# Patient Record
Sex: MALE | Race: WHITE | NOT HISPANIC OR LATINO
[De-identification: names, ages, dates, MRNs, and addresses within clinical notes are randomized per-mention and may not be internally consistent; named-entity substitution may affect disease eponyms.]

---

## 2018-01-23 ENCOUNTER — TRANSCRIPTION ENCOUNTER (OUTPATIENT)
Age: 48
End: 2018-01-23

## 2018-04-04 ENCOUNTER — TRANSCRIPTION ENCOUNTER (OUTPATIENT)
Age: 48
End: 2018-04-04

## 2021-06-03 ENCOUNTER — APPOINTMENT (OUTPATIENT)
Dept: ORTHOPEDIC SURGERY | Facility: CLINIC | Age: 51
End: 2021-06-03
Payer: COMMERCIAL

## 2021-06-03 VITALS
SYSTOLIC BLOOD PRESSURE: 147 MMHG | WEIGHT: 235 LBS | BODY MASS INDEX: 33.64 KG/M2 | HEIGHT: 70 IN | HEART RATE: 68 BPM | DIASTOLIC BLOOD PRESSURE: 78 MMHG

## 2021-06-03 DIAGNOSIS — Z60.2 PROBLEMS RELATED TO LIVING ALONE: ICD-10-CM

## 2021-06-03 DIAGNOSIS — Z78.9 OTHER SPECIFIED HEALTH STATUS: ICD-10-CM

## 2021-06-03 DIAGNOSIS — G24.9 DYSTONIA, UNSPECIFIED: ICD-10-CM

## 2021-06-03 DIAGNOSIS — Z80.9 FAMILY HISTORY OF MALIGNANT NEOPLASM, UNSPECIFIED: ICD-10-CM

## 2021-06-03 PROCEDURE — 72100 X-RAY EXAM L-S SPINE 2/3 VWS: CPT

## 2021-06-03 PROCEDURE — 99204 OFFICE O/P NEW MOD 45 MIN: CPT

## 2021-06-03 PROCEDURE — 99072 ADDL SUPL MATRL&STAF TM PHE: CPT

## 2021-06-03 RX ORDER — MELOXICAM 15 MG/1
15 TABLET ORAL
Qty: 30 | Refills: 1 | Status: ACTIVE | COMMUNITY
Start: 2021-06-03 | End: 1900-01-01

## 2021-06-03 SDOH — SOCIAL STABILITY - SOCIAL INSECURITY: PROBLEMS RELATED TO LIVING ALONE: Z60.2

## 2021-06-22 ENCOUNTER — TRANSCRIPTION ENCOUNTER (OUTPATIENT)
Age: 51
End: 2021-06-22

## 2021-06-28 ENCOUNTER — APPOINTMENT (OUTPATIENT)
Dept: ORTHOPEDIC SURGERY | Facility: CLINIC | Age: 51
End: 2021-06-28
Payer: COMMERCIAL

## 2021-06-28 PROCEDURE — 99442: CPT

## 2023-08-22 ENCOUNTER — APPOINTMENT (OUTPATIENT)
Dept: ORTHOPEDIC SURGERY | Facility: CLINIC | Age: 53
End: 2023-08-22
Payer: COMMERCIAL

## 2023-08-22 VITALS — HEIGHT: 70 IN | WEIGHT: 235 LBS | BODY MASS INDEX: 33.64 KG/M2

## 2023-08-22 PROCEDURE — 99214 OFFICE O/P EST MOD 30 MIN: CPT

## 2023-08-22 PROCEDURE — 72082 X-RAY EXAM ENTIRE SPI 2/3 VW: CPT

## 2024-02-21 ENCOUNTER — APPOINTMENT (OUTPATIENT)
Dept: ORTHOPEDIC SURGERY | Facility: CLINIC | Age: 54
End: 2024-02-21
Payer: COMMERCIAL

## 2024-02-21 PROCEDURE — 99442: CPT | Mod: 93

## 2024-02-21 RX ORDER — MELOXICAM 15 MG/1
15 TABLET ORAL
Qty: 30 | Refills: 3 | Status: ACTIVE | COMMUNITY
Start: 2023-08-22 | End: 1900-01-01

## 2024-03-27 ENCOUNTER — APPOINTMENT (OUTPATIENT)
Dept: MRI IMAGING | Facility: CLINIC | Age: 54
End: 2024-03-27

## 2024-03-27 ENCOUNTER — OUTPATIENT (OUTPATIENT)
Dept: OUTPATIENT SERVICES | Facility: HOSPITAL | Age: 54
LOS: 1 days | End: 2024-03-27
Payer: COMMERCIAL

## 2024-03-27 ENCOUNTER — RESULT REVIEW (OUTPATIENT)
Age: 54
End: 2024-03-27

## 2024-03-27 DIAGNOSIS — Q76.2 CONGENITAL SPONDYLOLISTHESIS: ICD-10-CM

## 2024-03-27 PROCEDURE — 72148 MRI LUMBAR SPINE W/O DYE: CPT

## 2024-03-27 PROCEDURE — 72148 MRI LUMBAR SPINE W/O DYE: CPT | Mod: 26

## 2024-04-01 ENCOUNTER — APPOINTMENT (OUTPATIENT)
Dept: ORTHOPEDIC SURGERY | Facility: CLINIC | Age: 54
End: 2024-04-01
Payer: COMMERCIAL

## 2024-04-01 DIAGNOSIS — M48.07 SPINAL STENOSIS, LUMBOSACRAL REGION: ICD-10-CM

## 2024-04-01 DIAGNOSIS — M51.26 OTHER INTERVERTEBRAL DISC DISPLACEMENT, LUMBAR REGION: ICD-10-CM

## 2024-04-01 DIAGNOSIS — Q76.2 CONGENITAL SPONDYLOLISTHESIS: ICD-10-CM

## 2024-04-01 DIAGNOSIS — M41.9 SCOLIOSIS, UNSPECIFIED: ICD-10-CM

## 2024-04-01 PROCEDURE — 99442: CPT

## 2024-04-01 NOTE — PHYSICAL EXAM
[de-identified] : MR SPINE LUMBAR  - ORDERED BY: HECTOR NATH  PROCEDURE DATE:  03/27/2024  INTERPRETATION:  CLINICAL INFORMATION: Low back pain  ADDITIONAL CLINICAL INFORMATION: Not Applicable  TECHNIQUE: Multiplanar, multisequence MRI was performed of the lumbar spine. IV Contrast: NONE  PRIOR STUDIES: No priors available for comparison.  FINDINGS:  LOCALIZER: No additional findings. BONES: Chronic bilateral L5 spondylolysis. Suggestion of mild reverse S-shaped thoracolumbar scoliosis. ALIGNMENT: Grade I anterolisthesis of L5 on S1. Endplate degenerative changes at T12-L1. SACROILIAC JOINTS/SACRUM: There is no sacral fracture. The SI joints are partially visualized but are intact. CONUS AND CAUDA EQUINA: The distal cord and conus are normal in signal. Conus terminates at L1. VISUALIZED INTRAPELVIC/INTRA-ABDOMINAL SOFT TISSUES: Likely left renal cyst. PARASPINAL SOFT TISSUES: Paraspinal muscle fatty infiltration with atrophy distally.   INDIVIDUAL LEVELS:  LOWER THORACIC SPINE: Mild disc bulge with osseous ridging asymmetric to the left with moderate left foraminal narrowing at T12-L1.  L1-L2: No spinal canal or neuroforaminal stenosis. L2-L3: Small left paracentral protrusion which narrows the left lateral recess and contacts the left L3 descending nerve. No significant spinal canal stenosis. Mild left foraminal narrowing. L3-L4: Mild disc bulge. Mild bilateral facet arthrosis with left effusion. No spinal canal stenosis. Minimal right foraminal narrowing. L4-L5: Mild disc bulge. Moderate right greater than left facet arthrosis with left effusion. No significant spinal canal stenosis. Moderate right greater than left foraminal narrowing. L5-S1: Grade I anterolisthesis with disc uncovering. Bilateral facet arthrosis. Mild spinal canal stenosis. Severe right and minimal left foraminal narrowing.   IMPRESSION:  Mild-to-moderate multilevel lumbar spondylosis, most notably at L5-S1 where there is grade I anterolisthesis in the setting of bilateral spondylolysis, mild spinal canal stenosis and severe right foraminal narrowing.

## 2024-04-01 NOTE — DISCUSSION/SUMMARY
[de-identified] : 54 yo male with spondylolisthesis, scoliosis, f/u pain management. No surgical intervention.  Diagnosis, prognosis, natural history and treatment was discussed with patient. Patient was advised if the following symptoms develop: chills, fever,  loss of bladder control, bowel incontinence or urinary retention, numbness/tingling or weakness is present in upper or lower extremities, to go to the nearest emergency room. This may be a new clinical condition not present at the time of the patient visit  that may lead to paralysis and/or death, Patient advised if the above symptoms developed to also call the office immediately to inform us and to go to the nearest emergency room.

## 2024-04-01 NOTE — REASON FOR VISIT
[_______] : carole العراقي  for [FreeTextEntry2] : ANNA WILBURN is a 53 year old male being seen for a Telehealth for an MRI review. This remote educational consult was provided via real-time 2-way audio visual technology. The participant, ANNA WILBURN as located at home, [address] [place] [address], at the time of the educational consult.  The provider, HECTOR NATH, was located at the medical office located in at the time of the visit. The participant, ANNA WILBURN, and Provider participated in the remote educational consult. Verbal consent given on April 1, 2024 by the participant. ANNA WILBURN is a 53 year-old who presents to today's telehealth visit for MRI review.

## 2024-04-08 ENCOUNTER — NON-APPOINTMENT (OUTPATIENT)
Age: 54
End: 2024-04-08

## 2024-04-08 ENCOUNTER — APPOINTMENT (OUTPATIENT)
Dept: PHYSICAL MEDICINE AND REHAB | Facility: CLINIC | Age: 54
End: 2024-04-08
Payer: COMMERCIAL

## 2024-04-08 PROCEDURE — 99204 OFFICE O/P NEW MOD 45 MIN: CPT

## 2024-04-08 NOTE — ASSESSMENT
[FreeTextEntry1] : Mr. ANNA WILBURN is a 53 year M with pain in the lower back on the RIGHT > LEFT with radiation down the leg to the FEET. He reports chronic long standing pain and is noting an acute on chronic exacerbation of this pain due to lumbar radiculopathy. There are no myelopathic signs on today's exam.  Patient reassured and educated on the diagnosis and treatment options. Risks and benefits of treatment and of delaying treatment discussed with patient. Risks discussed include but not limited to: progression of symptoms, worsening pain and functional status, etc.  This note was generated using Dragon medical dictation software. A reasonable effort had been made for proofreading its contents, but spelling mistakes or grammatical errors may still remain. If there are any questions or points of clarification needed please notify my office.  Dr. Coleman's note reviewed MRI L spine reviewed Lara placard completed today No opiates due to hx of substance abuse ?Lidocaine allergy  Patient had tried and failed conservative treatment. This includes but is not limited to: Acetaminophen, NSAIDs, muscle relaxants, neuropathic medications, physician directed home exercises and/or physical therapy for at least 8 weeks. After a thorough discussion of risks and benefits patient would like to proceed with LUMBAR INTERLAMINAR EPIDURAL STEROID INJECTION AT L5-S1 WITH FLUOROSCOPIC GUIDANCE  Risks and benefits of having injection discussed with patient. Risks discussed included, but not limited to: pain, infection, bleeding requiring emergency surgery, headaches, damage to vital organs, etc.  At this time, patient appears to be psychologically stable. Based on the history, physical exam and diagnostic findings, patient will benefit from this procedure. The goal of this procedure is to reduce pain and inflammation, improve function and range of motion and to further promote increased activity and return to previous level of functioning. The ultimate goal of performing this procedure is to improve patient's quality of life.  Asking for authorization for  LUMBAR INTERLAMINAR EPIDURAL STEROID INJECTION AT L5-S1 WITH FLUOROSCOPIC GUIDANCE to help treat patients pain.  Patient will be scheduled for this procedure.  Patient was advised if the following symptoms develop: chills, fever, loss of bladder control, bowel incontinence or urinary retention, numbness/tingling or weakness is present in upper or lower extremities, to go to the nearest emergency room. This may be a new clinical condition not present at the time of the patient visit that may lead to paralysis and/or death. Patient advised if the above symptoms developed to also call the office immediately to inform us and to go to the nearest emergency room.

## 2024-04-08 NOTE — HISTORY OF PRESENT ILLNESS
[FreeTextEntry1] : ANNA WILBURN is an 53 year old M here for initial evaluation of BACK PAIN. Mr. WILBURN was sent for consultation by Dr. Coleman for possible injection.   From Dr. Coleman's note on 4/1/24: "52 yo male with spondylolisthesis, scoliosis, f/u pain management. 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 He also has dystonia and blepharospasm and gets Botox injections for it.  Patient has potential Lidocaine allergy and notes that when he had deviated septum and had lidocaine packing her had cardiac arrest. However, he had shoulder surgery with lidocaine thereafter and did not have any issues.  Pain location: lower back Quality: sharp, shooting, stabbing Radiation: down both legs Severity: 10/10 on NRS Onset: many years ago with recent exacerbation Associated symptoms: muscle spasms, tingling Numbness: denies Weakness: present Exacerbated by: activity, standing Improved by: rest, hot showers Bowel or bladder involvement: denies  Denies bowel/bladder dysfunction, saddle anesthesia, fevers, chills, weight loss, night pain, or night sweats at this time.  The pain interferes with function, ADLs and quality of life. Patient had tried Acetaminophen, NSAIDs, prescription pain medications, muscle relaxants without any lasting relief of pain.  Patient had imaging studies to evaluate the pain.  Patient had tried physical therapy, and/or physician directed home exercises, stretching, lifestyle modification for over 6 months without any significant relief.

## 2024-04-08 NOTE — PHYSICAL EXAM
[FreeTextEntry1] : General exam   Constitutional: The patient appears well-developed, well-nourished, and in no apparent distress. Patient is well-groomed.    Skin: The skin is warm and dry, with normal turgor.  Eyes: Difficulty keeping eyes open due to blepharospasm and light sensitivity  ENMT: Ears: Hearing is grossly within normal limits.    Neck: Supple: The neck is supple.    Respiratory: Inspection: Breathing unlabored.    Neurologic: Alert and oriented x 3. Uncontrollable shaking and dystonia.  Psychiatric: Patient is cooperative and appropriate.  Mood and affect are normal.  Patient's insight is good, and memory and judgment are intact.  LUMBAR EXAM   APPEARANCE: No visible scars truncal obesity No gross deformity or malalignment No erythema, swelling or ecchymosis Decreased lumbar lordosis No muscle atrophy of the left lower extremity No muscle atrophy of the right lower extremity   TENDERNESS: Multiple trigger points over bilateral lumbar paraspinal muscles neg over midline spinous processes + over left lumbar paraspinal muscles: erector spinae and quadratus lumborum + over right lumbar paraspinal muscles: erector spinae and quadratus lumborum   ROM: Pain limited A/PROM of the lumbar spine + pain with flexion, extension of the lumbar spine + pain with left side bending + pain with right side bending + pain with left rotation + pain with right rotation + hamstring tightness on the left + hamstring tightness on the right   SPECIAL TESTS: + left straight leg raising test + right straight leg raising test FABERE left neg FABERE right neg   SENSORY TESTING: Intact to light touch Left L1-S2 Intact to light touch Right L1-S2   MOTOR TESTING: Muscle tone of the left lower extremity is normal Muscle tone of the right lower extremity is normal   Left hip flexion strength is 5/5 Right hip flexion strength is 5/5   Left quadriceps strength is 5/5 Right quadriceps strength is 5/5   Left ankle dorsiflexion strength is 5/5 Right ankle dorsiflexion strength is 5/5   Left ankle plantar flexion strength is 5/5 Right ankle plantar flexion strength is 5/5   REFLEXES: Patella (L4) left 0 Patella (L4) right 0   GAIT: +antalgic gait

## 2024-04-26 ENCOUNTER — APPOINTMENT (OUTPATIENT)
Dept: PHYSICAL MEDICINE AND REHAB | Facility: CLINIC | Age: 54
End: 2024-04-26
Payer: COMMERCIAL

## 2024-04-26 ENCOUNTER — OUTPATIENT (OUTPATIENT)
Dept: OUTPATIENT SERVICES | Facility: HOSPITAL | Age: 54
LOS: 1 days | End: 2024-04-26
Payer: COMMERCIAL

## 2024-04-26 DIAGNOSIS — M54.17 RADICULOPATHY, LUMBOSACRAL REGION: ICD-10-CM

## 2024-04-26 PROCEDURE — 62323 NJX INTERLAMINAR LMBR/SAC: CPT

## 2024-04-26 NOTE — PROCEDURE
[de-identified] : [facility] [address], [place], [address] - [phone]   PATIENT: ANNA WILBURN MEDICAL RECORD: [id number] DATE OF PROCEDURE: 04/26/2024   PHYSICIAN: Miles Guardado DO  LUMBAR INTERLAMINAR EPIDURAL STEROID INJECTION WITH FLUOROSCOPIC GUIDANCE  Levels injected: L5-S1  Injectate: 80mg/mL methylPrednisolone and 0.25% Bupivacaine 1mL and 3mL Normal Saline Preservative Free  Complications: None  Estimated Blood Loss: None   Technique: After informed consent was obtained, the patient was taken to the operating room and placed in the prone position. All pressure points were supported. The lumbar region was then exposed and prepped with chlorhexidine and draped in a sterile manner. The entirety of the procedure was done under sterile technique using sterile gloves, surgical mask and cap and all medication expiration dates were verified prior to being drawn into syringes.   Using AP fluoroscopy, the L5-S1 interspace was identified. Following this, a skin wheal was created using a 25-gauge 1-1/2 inch needle and 3 mL of preservative free 1% lidocaine. Patient had lidocaine allergy listed, however on history he reported that he had a reaction to it when undergoing deviated septum surgery. Since then he had shoulder surgery with lidocaine without any issues. Through this skin wheal, a 20-gauge tuohy epidural needle was advanced under intermittent fluoroscopy until engagement with ligamentum flavum. At this point, a loss of resistance syringe filled with sterile saline was attached to the epidural needle and was used to gain access to the epidural space under intermittent AP/lateral fluoroscopy. After negative aspiration of heme and CSF, 1 mL of Omnipaque 240 contrast was injected to delineate epidural spread under fluoroscopic view. Following this, again after negative aspiration for heme and CSF, the entirety of the above mentioned injectate was easily injected to the L5-S1 epidural space without paresthesias or complications.   The patient tolerated the procedure well and was taken to phase II recovery room in stable condition with bilateral lower extremity motor and sensation intact.

## 2024-05-22 ENCOUNTER — APPOINTMENT (OUTPATIENT)
Dept: PHYSICAL MEDICINE AND REHAB | Facility: CLINIC | Age: 54
End: 2024-05-22
Payer: COMMERCIAL

## 2024-05-22 PROCEDURE — 99213 OFFICE O/P EST LOW 20 MIN: CPT

## 2024-05-22 NOTE — ASSESSMENT
[FreeTextEntry1] : Mr. ANNA WILBURN is a 53 year M with pain in the lower back on the RIGHT > LEFT with radiation down the leg to the FEET due to lumbar radiculopathy. He is s/p LUMBAR INTERLAMINAR EPIDURAL STEROID INJECTION AT L5-S1 WITH FLUOROSCOPIC GUIDANCE with excellent but short relief.  Patient reassured and educated on the diagnosis and treatment options. Risks and benefits of treatment and of delaying treatment discussed with patient. Risks discussed include but not limited to: progression of symptoms, worsening pain and functional status, etc.  This note was generated using Dragon medical dictation software. A reasonable effort had been made for proofreading its contents, but spelling mistakes or grammatical errors may still remain. If there are any questions or points of clarification needed please notify my office.  Dr. Coleman's note reviewed MRI L spine reviewed No opiates due to hx of substance abuse ?Lidocaine allergy - not a real allergy  Patient had tried and failed conservative treatment. This includes but is not limited to: Acetaminophen, NSAIDs, muscle relaxants, neuropathic medications, physician directed home exercises and/or physical therapy for at least 8 weeks. After a thorough discussion of risks and benefits patient would like to proceed with LUMBAR INTERLAMINAR EPIDURAL STEROID INJECTION AT L5-S1 WITH FLUOROSCOPIC GUIDANCE  Risks and benefits of having injection discussed with patient. Risks discussed included, but not limited to: pain, infection, bleeding requiring emergency surgery, headaches, damage to vital organs, etc.  At this time, patient appears to be psychologically stable. Based on the history, physical exam and diagnostic findings, patient will benefit from this procedure. The goal of this procedure is to reduce pain and inflammation, improve function and range of motion and to further promote increased activity and return to previous level of functioning. The ultimate goal of performing this procedure is to improve patient's quality of life.  Asking for authorization for LUMBAR INTERLAMINAR EPIDURAL STEROID INJECTION AT L5-S1 WITH FLUOROSCOPIC GUIDANCE to help treat patients pain. Patient will be scheduled for this procedure.  Sending patient for PT AQUATHERAPY for BACK PAIN to help relieve pain and improve function. Stretching, strengthening, ROM, home education and other appropriate interventions. Precautions include fall prevention.  Patient was advised if the following symptoms develop: chills, fever, loss of bladder control, bowel incontinence or urinary retention, numbness/tingling or weakness is present in upper or lower extremities, to go to the nearest emergency room. This may be a new clinical condition not present at the time of the patient visit that may lead to paralysis and/or death. Patient advised if the above symptoms developed to also call the office immediately to inform us and to go to the nearest emergency room.

## 2024-05-22 NOTE — PHYSICAL EXAM
[FreeTextEntry1] : General exam   Constitutional: The patient appears well-developed, well-nourished, and in no apparent distress. Patient is well-groomed.    Skin: The skin is warm and dry, with normal turgor.  Eyes: PERRL.    ENMT: Ears: Hearing is grossly within normal limits.    Neck: Supple: The neck is supple.    Respiratory: Inspection: Breathing unlabored.    Neurologic: Alert and oriented x 3.   Psychiatric: Patient is cooperative and appropriate.  Mood and affect are normal.  Patient's insight is good, and memory and judgment are intact.  Lumbar Truncal obesity Skin c/d/i without any erythema, swelling, effusion  Diffuse tenderness Multiple trigger points SLR + b/l Antalgic gait

## 2024-05-22 NOTE — HISTORY OF PRESENT ILLNESS
[FreeTextEntry1] : ANNA WILBURN had LUMBAR INTERLAMINAR EPIDURAL STEROID INJECTION WITH FLUOROSCOPIC GUIDANCE L5-S1 on 4/26/24. Today patient is here for follow up. Patient reports 100% reduction in pain as tested by the NRS pain scale (Pain went down from 10/10 on NRS prior to injection). Patient also reports improvement in quality of life - he was able to put on shoes and tie his laces,  front of the kitchen sink and enjoy a walk. However, his pain is coming back. It is 0/10 on NRS at this time but when he stands up and walks, after a minute it becomes 8/10 on the lower back and down the legs.   Denies any new pain, numbness or weakness, bowel/bladder dysfunction, saddle anesthesia, fevers, chills, weight loss, night pain, or night sweats at this time.

## 2024-06-07 ENCOUNTER — APPOINTMENT (OUTPATIENT)
Dept: PHYSICAL MEDICINE AND REHAB | Facility: CLINIC | Age: 54
End: 2024-06-07
Payer: COMMERCIAL

## 2024-06-07 ENCOUNTER — OUTPATIENT (OUTPATIENT)
Dept: OUTPATIENT SERVICES | Facility: HOSPITAL | Age: 54
LOS: 1 days | End: 2024-06-07
Payer: COMMERCIAL

## 2024-06-07 DIAGNOSIS — M54.16 RADICULOPATHY, LUMBAR REGION: ICD-10-CM

## 2024-06-07 PROCEDURE — 62323 NJX INTERLAMINAR LMBR/SAC: CPT

## 2024-06-07 NOTE — PROCEDURE
[de-identified] : [facility] [address], [place], [address] - [phone]   PATIENT: ANNA WILBURN MEDICAL RECORD: [id number] DATE OF PROCEDURE: 06/07/2024   PHYSICIAN: Miles Guardado DO  LUMBAR INTERLAMINAR EPIDURAL STEROID INJECTION WITH FLUOROSCOPIC GUIDANCE  Levels injected: L5-S1  Injectate: 80mg/mL methylPrednisolone and 0.25% Bupivacaine 1mL and 3mL Normal Saline Preservative Free  Complications: None  Estimated Blood Loss: None   Technique: After informed consent was obtained, the patient was taken to the operating room and placed in the prone position. All pressure points were supported. The lumbar region was then exposed and prepped with chlorhexidine and draped in a sterile manner. The entirety of the procedure was done under sterile technique using sterile gloves, surgical mask and cap and all medication expiration dates were verified prior to being drawn into syringes.   Using AP fluoroscopy, the L5-S1 interspace was identified. Following this, a skin wheal was created using a 25-gauge 1-1/2 inch needle and 3 mL of preservative free 1% lidocaine. Through this skin wheal, a 20-gauge tuohy epidural needle was advanced under intermittent fluoroscopy until engagement with ligamentum flavum. At this point, a loss of resistance syringe filled with sterile saline was attached to the epidural needle and was used to gain access to the epidural space under intermittent AP/lateral fluoroscopy. After negative aspiration of heme and CSF, 1 mL of Omnipaque 240 contrast was injected to delineate epidural spread under fluoroscopic view. Following this, again after negative aspiration for heme and CSF, the entirety of the above mentioned injectate was easily injected to the L5-S1 epidural space without paresthesias or complications.   The patient tolerated the procedure well and was taken to phase II recovery room in stable condition with bilateral lower extremity motor and sensation intact.

## 2024-06-10 DIAGNOSIS — M54.17 RADICULOPATHY, LUMBOSACRAL REGION: ICD-10-CM

## 2024-07-03 ENCOUNTER — APPOINTMENT (OUTPATIENT)
Dept: PHYSICAL MEDICINE AND REHAB | Facility: CLINIC | Age: 54
End: 2024-07-03
Payer: COMMERCIAL

## 2024-07-03 DIAGNOSIS — M54.16 RADICULOPATHY, LUMBAR REGION: ICD-10-CM

## 2024-07-03 PROCEDURE — 99213 OFFICE O/P EST LOW 20 MIN: CPT

## 2024-08-28 ENCOUNTER — APPOINTMENT (OUTPATIENT)
Dept: PHYSICAL MEDICINE AND REHAB | Facility: CLINIC | Age: 54
End: 2024-08-28

## 2024-09-11 ENCOUNTER — APPOINTMENT (OUTPATIENT)
Dept: PHYSICAL MEDICINE AND REHAB | Facility: CLINIC | Age: 54
End: 2024-09-11
Payer: COMMERCIAL

## 2024-09-11 DIAGNOSIS — M54.16 RADICULOPATHY, LUMBAR REGION: ICD-10-CM

## 2024-09-11 PROCEDURE — 99214 OFFICE O/P EST MOD 30 MIN: CPT

## 2024-09-11 NOTE — HISTORY OF PRESENT ILLNESS
[FreeTextEntry1] : ANNA WILBURN had LUMBAR INTERLAMINAR EPIDURAL STEROID INJECTION WITH FLUOROSCOPIC GUIDANCE L5-S1 on 6/7/24. Today patient is here for follow up again. Patient reports greater than 80% reduction in pain as tested by the NRS pain scale (Pain went down from 10/10 on NRS prior to injection). Patient also reports improvement in quality of life. In addition, patient reports improvement of function and ADLs along with a temporary decrease in pain medication use. Pain started to return and is 6+/10 on NRS at this time and goes down both legs. He would like to repeat the injection. His goal is to be able to continue HEP that we previously discussed.  Denies any new pain, numbness or weakness, bowel/bladder dysfunction, saddle anesthesia, fevers, chills, weight loss, night pain, or night sweats at this time.

## 2024-09-11 NOTE — END OF VISIT
[] : Resident [FreeTextEntry3] : Seen with Dr. 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 Any part of the history and physical exam performed by the physician in training was either directly reviewed and/or replicated by myself. Any procedure performed by the physician in training was performed under my direct supervision and with the consent of the patient.

## 2024-09-11 NOTE — PHYSICAL EXAM
[FreeTextEntry1] : General exam  Constitutional: The patient appears well-developed, well-nourished, and in no apparent distress. Patient is well-groomed.  Skin: The skin is warm and dry, with normal turgor.  Eyes: PERRL.  ENMT: Ears: Hearing is grossly within normal limits.  Neck: Supple: The neck is supple.  Respiratory: Inspection: Breathing unlabored.  Neurologic: Alert and oriented x 3.  Psychiatric: Patient is cooperative and appropriate. Mood and affect are normal. Patient's insight is good, and memory and judgment are intact.  Lumbar Skin c/d/i without any erythema, swelling, effusion  Diffuse tenderness Multiple trigger points Pain with bending forward SLR + b/l

## 2024-09-11 NOTE — ASSESSMENT
[FreeTextEntry1] : Mr. ANNA WILBURN is a 54 year M with pain in the lower back on the RIGHT > LEFT with radiation down the leg to the FEET due to lumbar radiculopathy. He is s/p LUMBAR INTERLAMINAR EPIDURAL STEROID INJECTION AT L5-S1 WITH FLUOROSCOPIC GUIDANCE x2 with excellent relief. Pain is starting to return.  Patient reassured and educated on the diagnosis and treatment options. Risks and benefits of treatment and of delaying treatment discussed with patient. Risks discussed include but not limited to: progression of symptoms, worsening pain and functional status, etc.  This note was generated using Dragon medical dictation software. A reasonable effort had been made for proofreading its contents, but spelling mistakes or grammatical errors may still remain. If there are any questions or points of clarification needed please notify my office.  No opiates due to hx of substance abuse ?Lidocaine allergy - not a real allergy  Patient had tried and failed conservative treatment. This includes but is not limited to: Acetaminophen, NSAIDs, muscle relaxants, neuropathic medications, physician directed home exercises and/or physical therapy for at least 8 weeks. After a thorough discussion of risks and benefits patient would like to proceed with LUMBAR INTERLAMINAR EPIDURAL STEROID INJECTION AT L5-S1 WITH FLUOROSCOPIC GUIDANCE  Risks and benefits of having injection discussed with patient. Risks discussed included, but not limited to: pain, infection, bleeding requiring emergency surgery, headaches, damage to vital organs, etc.  At this time, patient appears to be psychologically stable. Based on the history, physical exam and diagnostic findings, patient will benefit from this procedure. The goal of this procedure is to reduce pain and inflammation, improve function and range of motion and to further promote increased activity and return to previous level of functioning. The ultimate goal of performing this procedure is to improve patient's quality of life.  Asking for authorization for LUMBAR INTERLAMINAR EPIDURAL STEROID INJECTION AT L5-S1 WITH FLUOROSCOPIC GUIDANCE to help treat patients pain.  Patient will be scheduled for this procedure.  Patient was advised if the following symptoms develop: chills, fever, loss of bladder control, bowel incontinence or urinary retention, numbness/tingling or weakness is present in upper or lower extremities, to go to the nearest emergency room. This may be a new clinical condition not present at the time of the patient visit that may lead to paralysis and/or death. Patient advised if the above symptoms developed to also call the office immediately to inform us and to go to the nearest emergency room.

## 2024-09-27 ENCOUNTER — OUTPATIENT (OUTPATIENT)
Dept: OUTPATIENT SERVICES | Facility: HOSPITAL | Age: 54
LOS: 1 days | End: 2024-09-27
Payer: COMMERCIAL

## 2024-09-27 ENCOUNTER — APPOINTMENT (OUTPATIENT)
Dept: PHYSICAL MEDICINE AND REHAB | Facility: CLINIC | Age: 54
End: 2024-09-27
Payer: COMMERCIAL

## 2024-09-27 DIAGNOSIS — M54.17 RADICULOPATHY, LUMBOSACRAL REGION: ICD-10-CM

## 2024-09-27 DIAGNOSIS — M54.16 RADICULOPATHY, LUMBAR REGION: ICD-10-CM

## 2024-09-27 PROCEDURE — 62323 NJX INTERLAMINAR LMBR/SAC: CPT

## 2024-09-27 NOTE — PROCEDURE
[de-identified] : [facility] [address], [place], [address] - [phone]   PATIENT: ANNA WILBURN MEDICAL RECORD: [id number] DATE OF PROCEDURE: 09/27/2024   PHYSICIAN: Miles Guardado DO  LUMBAR INTERLAMINAR EPIDURAL STEROID INJECTION WITH FLUOROSCOPIC GUIDANCE  Levels injected: L5-S1  Injectate: 80mg/mL methylPrednisolone and 0.25% Bupivacaine 1mL and 3mL Normal Saline Preservative Free  Complications: None  Estimated Blood Loss: None   Technique: After informed consent was obtained, the patient was taken to the operating room and placed in the prone position. All pressure points were supported. The lumbar region was then exposed and prepped with chlorhexidine and draped in a sterile manner. The entirety of the procedure was done under sterile technique using sterile gloves, surgical mask and cap and all medication expiration dates were verified prior to being drawn into syringes.   Using AP fluoroscopy, the L5-S1 interspace was identified. Following this, a skin wheal was created using a 25-gauge 1-1/2 inch needle and 3 mL of preservative free 1% lidocaine. Through this skin wheal, a 20-gauge tuohy epidural needle was advanced under intermittent fluoroscopy until engagement with ligamentum flavum. At this point, a loss of resistance syringe filled with sterile saline was attached to the epidural needle and was used to gain access to the epidural space under intermittent AP/lateral fluoroscopy. After negative aspiration of heme and CSF, 1 mL of Omnipaque 240 contrast was injected to delineate epidural spread under fluoroscopic view. Following this, again after negative aspiration for heme and CSF, the entirety of the above mentioned injectate was easily injected to the L5-S1 epidural space without paresthesias or complications.   The patient tolerated the procedure well and was taken to phase II recovery room in stable condition with bilateral lower extremity motor and sensation intact.

## 2024-10-30 ENCOUNTER — APPOINTMENT (OUTPATIENT)
Dept: PHYSICAL MEDICINE AND REHAB | Facility: CLINIC | Age: 54
End: 2024-10-30

## 2024-11-27 ENCOUNTER — APPOINTMENT (OUTPATIENT)
Dept: PHYSICAL MEDICINE AND REHAB | Facility: CLINIC | Age: 54
End: 2024-11-27
Payer: COMMERCIAL

## 2024-11-27 DIAGNOSIS — M48.07 SPINAL STENOSIS, LUMBOSACRAL REGION: ICD-10-CM

## 2024-11-27 DIAGNOSIS — M51.26 OTHER INTERVERTEBRAL DISC DISPLACEMENT, LUMBAR REGION: ICD-10-CM

## 2024-11-27 PROCEDURE — 99213 OFFICE O/P EST LOW 20 MIN: CPT

## 2025-01-09 ENCOUNTER — INPATIENT (INPATIENT)
Facility: HOSPITAL | Age: 55
LOS: 1 days | Discharge: HOME | DRG: 558 | End: 2025-01-11
Attending: HOSPITALIST | Admitting: INTERNAL MEDICINE
Payer: COMMERCIAL

## 2025-01-09 VITALS
SYSTOLIC BLOOD PRESSURE: 149 MMHG | TEMPERATURE: 98 F | HEART RATE: 107 BPM | OXYGEN SATURATION: 94 % | WEIGHT: 222.67 LBS | HEIGHT: 70 IN | RESPIRATION RATE: 20 BRPM | DIASTOLIC BLOOD PRESSURE: 93 MMHG

## 2025-01-09 DIAGNOSIS — Z29.9 ENCOUNTER FOR PROPHYLACTIC MEASURES, UNSPECIFIED: ICD-10-CM

## 2025-01-09 DIAGNOSIS — R93.89 ABNORMAL FINDINGS ON DIAGNOSTIC IMAGING OF OTHER SPECIFIED BODY STRUCTURES: ICD-10-CM

## 2025-01-09 DIAGNOSIS — L03.90 CELLULITIS, UNSPECIFIED: ICD-10-CM

## 2025-01-09 DIAGNOSIS — G24.9 DYSTONIA, UNSPECIFIED: ICD-10-CM

## 2025-01-09 DIAGNOSIS — M65.90 UNSPECIFIED SYNOVITIS AND TENOSYNOVITIS, UNSPECIFIED SITE: ICD-10-CM

## 2025-01-09 DIAGNOSIS — K21.9 GASTRO-ESOPHAGEAL REFLUX DISEASE WITHOUT ESOPHAGITIS: ICD-10-CM

## 2025-01-09 LAB
ANION GAP SERPL CALC-SCNC: 8 MMOL/L — SIGNIFICANT CHANGE UP (ref 5–17)
ANION GAP SERPL CALC-SCNC: 9 MMOL/L — SIGNIFICANT CHANGE UP (ref 5–17)
APTT BLD: 31 SEC — SIGNIFICANT CHANGE UP (ref 24.5–35.6)
BASOPHILS # BLD AUTO: 0.03 K/UL — SIGNIFICANT CHANGE UP (ref 0–0.2)
BASOPHILS NFR BLD AUTO: 0.4 % — SIGNIFICANT CHANGE UP (ref 0–2)
BUN SERPL-MCNC: 12 MG/DL — SIGNIFICANT CHANGE UP (ref 7–23)
BUN SERPL-MCNC: 9 MG/DL — SIGNIFICANT CHANGE UP (ref 7–23)
CALCIUM SERPL-MCNC: 10.1 MG/DL — SIGNIFICANT CHANGE UP (ref 8.4–10.5)
CALCIUM SERPL-MCNC: 10.4 MG/DL — SIGNIFICANT CHANGE UP (ref 8.4–10.5)
CHLORIDE SERPL-SCNC: 102 MMOL/L — SIGNIFICANT CHANGE UP (ref 96–108)
CHLORIDE SERPL-SCNC: 102 MMOL/L — SIGNIFICANT CHANGE UP (ref 96–108)
CO2 SERPL-SCNC: 29 MMOL/L — SIGNIFICANT CHANGE UP (ref 22–31)
CO2 SERPL-SCNC: 31 MMOL/L — SIGNIFICANT CHANGE UP (ref 22–31)
CREAT SERPL-MCNC: 0.92 MG/DL — SIGNIFICANT CHANGE UP (ref 0.5–1.3)
CREAT SERPL-MCNC: 0.96 MG/DL — SIGNIFICANT CHANGE UP (ref 0.5–1.3)
EGFR: 93 ML/MIN/1.73M2 — SIGNIFICANT CHANGE UP
EGFR: 98 ML/MIN/1.73M2 — SIGNIFICANT CHANGE UP
EOSINOPHIL # BLD AUTO: 0.04 K/UL — SIGNIFICANT CHANGE UP (ref 0–0.5)
EOSINOPHIL NFR BLD AUTO: 0.5 % — SIGNIFICANT CHANGE UP (ref 0–6)
GLUCOSE SERPL-MCNC: 218 MG/DL — HIGH (ref 70–99)
GLUCOSE SERPL-MCNC: 266 MG/DL — HIGH (ref 70–99)
HCT VFR BLD CALC: 48.9 % — SIGNIFICANT CHANGE UP (ref 39–50)
HCT VFR BLD CALC: 49.5 % — SIGNIFICANT CHANGE UP (ref 39–50)
HGB BLD-MCNC: 15.9 G/DL — SIGNIFICANT CHANGE UP (ref 13–17)
HGB BLD-MCNC: 15.9 G/DL — SIGNIFICANT CHANGE UP (ref 13–17)
IMM GRANULOCYTES NFR BLD AUTO: 0.3 % — SIGNIFICANT CHANGE UP (ref 0–0.9)
INR BLD: 1.11 RATIO — SIGNIFICANT CHANGE UP (ref 0.85–1.16)
LACTATE SERPL-SCNC: 1.3 MMOL/L — SIGNIFICANT CHANGE UP (ref 0.7–2)
LYMPHOCYTES # BLD AUTO: 1.49 K/UL — SIGNIFICANT CHANGE UP (ref 1–3.3)
LYMPHOCYTES # BLD AUTO: 20.3 % — SIGNIFICANT CHANGE UP (ref 13–44)
MAGNESIUM SERPL-MCNC: 1.8 MG/DL — SIGNIFICANT CHANGE UP (ref 1.6–2.6)
MCHC RBC-ENTMCNC: 25.1 PG — LOW (ref 27–34)
MCHC RBC-ENTMCNC: 25.4 PG — LOW (ref 27–34)
MCHC RBC-ENTMCNC: 32.1 G/DL — SIGNIFICANT CHANGE UP (ref 32–36)
MCHC RBC-ENTMCNC: 32.5 G/DL — SIGNIFICANT CHANGE UP (ref 32–36)
MCV RBC AUTO: 78.2 FL — LOW (ref 80–100)
MCV RBC AUTO: 78.2 FL — LOW (ref 80–100)
MONOCYTES # BLD AUTO: 0.64 K/UL — SIGNIFICANT CHANGE UP (ref 0–0.9)
MONOCYTES NFR BLD AUTO: 8.7 % — SIGNIFICANT CHANGE UP (ref 2–14)
NEUTROPHILS # BLD AUTO: 5.12 K/UL — SIGNIFICANT CHANGE UP (ref 1.8–7.4)
NEUTROPHILS NFR BLD AUTO: 69.8 % — SIGNIFICANT CHANGE UP (ref 43–77)
NRBC # BLD: 0 /100 WBCS — SIGNIFICANT CHANGE UP (ref 0–0)
NRBC # BLD: 0 /100 WBCS — SIGNIFICANT CHANGE UP (ref 0–0)
PHOSPHATE SERPL-MCNC: 3.4 MG/DL — SIGNIFICANT CHANGE UP (ref 2.5–4.5)
PLATELET # BLD AUTO: 271 K/UL — SIGNIFICANT CHANGE UP (ref 150–400)
PLATELET # BLD AUTO: 274 K/UL — SIGNIFICANT CHANGE UP (ref 150–400)
POTASSIUM SERPL-MCNC: 3.7 MMOL/L — SIGNIFICANT CHANGE UP (ref 3.5–5.3)
POTASSIUM SERPL-MCNC: 3.8 MMOL/L — SIGNIFICANT CHANGE UP (ref 3.5–5.3)
POTASSIUM SERPL-SCNC: 3.7 MMOL/L — SIGNIFICANT CHANGE UP (ref 3.5–5.3)
POTASSIUM SERPL-SCNC: 3.8 MMOL/L — SIGNIFICANT CHANGE UP (ref 3.5–5.3)
PROTHROM AB SERPL-ACNC: 12.9 SEC — SIGNIFICANT CHANGE UP (ref 9.9–13.4)
RBC # BLD: 6.25 M/UL — HIGH (ref 4.2–5.8)
RBC # BLD: 6.33 M/UL — HIGH (ref 4.2–5.8)
RBC # FLD: 13.6 % — SIGNIFICANT CHANGE UP (ref 10.3–14.5)
RBC # FLD: 13.9 % — SIGNIFICANT CHANGE UP (ref 10.3–14.5)
SODIUM SERPL-SCNC: 140 MMOL/L — SIGNIFICANT CHANGE UP (ref 135–145)
SODIUM SERPL-SCNC: 141 MMOL/L — SIGNIFICANT CHANGE UP (ref 135–145)
WBC # BLD: 7.34 K/UL — SIGNIFICANT CHANGE UP (ref 3.8–10.5)
WBC # BLD: 7.84 K/UL — SIGNIFICANT CHANGE UP (ref 3.8–10.5)
WBC # FLD AUTO: 7.34 K/UL — SIGNIFICANT CHANGE UP (ref 3.8–10.5)
WBC # FLD AUTO: 7.84 K/UL — SIGNIFICANT CHANGE UP (ref 3.8–10.5)

## 2025-01-09 PROCEDURE — 99223 1ST HOSP IP/OBS HIGH 75: CPT

## 2025-01-09 PROCEDURE — 99285 EMERGENCY DEPT VISIT HI MDM: CPT

## 2025-01-09 PROCEDURE — 73140 X-RAY EXAM OF FINGER(S): CPT | Mod: 26,LT

## 2025-01-09 PROCEDURE — 93010 ELECTROCARDIOGRAM REPORT: CPT

## 2025-01-09 PROCEDURE — 71045 X-RAY EXAM CHEST 1 VIEW: CPT | Mod: 26

## 2025-01-09 RX ORDER — ACETAMINOPHEN 80 MG/.8ML
650 SOLUTION/ DROPS ORAL EVERY 6 HOURS
Refills: 0 | Status: DISCONTINUED | OUTPATIENT
Start: 2025-01-09 | End: 2025-01-11

## 2025-01-09 RX ORDER — VANCOMYCIN HYDROCHLORIDE 5 G/100ML
1000 INJECTION, POWDER, LYOPHILIZED, FOR SOLUTION INTRAVENOUS ONCE
Refills: 0 | Status: COMPLETED | OUTPATIENT
Start: 2025-01-09 | End: 2025-01-09

## 2025-01-09 RX ORDER — DIPHENHYDRAMINE HCL 25 MG
25 TABLET ORAL EVERY 6 HOURS
Refills: 0 | Status: DISCONTINUED | OUTPATIENT
Start: 2025-01-09 | End: 2025-01-11

## 2025-01-09 RX ORDER — TRIHEXYPHENIDYL HYDROCHLORIDE 5 MG/1
2 TABLET ORAL
Refills: 0 | Status: DISCONTINUED | OUTPATIENT
Start: 2025-01-09 | End: 2025-01-11

## 2025-01-09 RX ORDER — VANCOMYCIN HYDROCHLORIDE 5 G/100ML
1500 INJECTION, POWDER, LYOPHILIZED, FOR SOLUTION INTRAVENOUS EVERY 12 HOURS
Refills: 0 | Status: DISCONTINUED | OUTPATIENT
Start: 2025-01-09 | End: 2025-01-10

## 2025-01-09 RX ORDER — AMPICILLIN SODIUM AND SULBACTAM SODIUM 100; 50 MG/ML; MG/ML
3 INJECTION, POWDER, FOR SOLUTION INTRAVENOUS ONCE
Refills: 0 | Status: COMPLETED | OUTPATIENT
Start: 2025-01-09 | End: 2025-01-09

## 2025-01-09 RX ORDER — AMPICILLIN SODIUM AND SULBACTAM SODIUM 100; 50 MG/ML; MG/ML
3 INJECTION, POWDER, FOR SOLUTION INTRAVENOUS EVERY 6 HOURS
Refills: 0 | Status: DISCONTINUED | OUTPATIENT
Start: 2025-01-09 | End: 2025-01-09

## 2025-01-09 RX ORDER — GINKGO BILOBA 40 MG
3 CAPSULE ORAL AT BEDTIME
Refills: 0 | Status: DISCONTINUED | OUTPATIENT
Start: 2025-01-09 | End: 2025-01-11

## 2025-01-09 RX ORDER — MAG HYDROX/ALUMINUM HYD/SIMETH 200-200-20
30 SUSPENSION, ORAL (FINAL DOSE FORM) ORAL EVERY 4 HOURS
Refills: 0 | Status: DISCONTINUED | OUTPATIENT
Start: 2025-01-09 | End: 2025-01-11

## 2025-01-09 RX ORDER — DIPHENHYDRAMINE HCL 25 MG
25 TABLET ORAL ONCE
Refills: 0 | Status: COMPLETED | OUTPATIENT
Start: 2025-01-09 | End: 2025-01-09

## 2025-01-09 RX ORDER — AMPICILLIN SODIUM AND SULBACTAM SODIUM 100; 50 MG/ML; MG/ML
3 INJECTION, POWDER, FOR SOLUTION INTRAVENOUS EVERY 6 HOURS
Refills: 0 | Status: DISCONTINUED | OUTPATIENT
Start: 2025-01-09 | End: 2025-01-10

## 2025-01-09 RX ORDER — PANTOPRAZOLE 40 MG/1
40 TABLET, DELAYED RELEASE ORAL
Refills: 0 | Status: DISCONTINUED | OUTPATIENT
Start: 2025-01-09 | End: 2025-01-11

## 2025-01-09 RX ORDER — DIPHENHYDRAMINE HCL 25 MG
0 TABLET ORAL
Refills: 0 | DISCHARGE

## 2025-01-09 RX ADMIN — AMPICILLIN SODIUM AND SULBACTAM SODIUM 200 GRAM(S): 100; 50 INJECTION, POWDER, FOR SOLUTION INTRAVENOUS at 12:49

## 2025-01-09 RX ADMIN — Medication 25 MILLIGRAM(S): at 08:31

## 2025-01-09 RX ADMIN — AMPICILLIN SODIUM AND SULBACTAM SODIUM 3 GRAM(S): 100; 50 INJECTION, POWDER, FOR SOLUTION INTRAVENOUS at 07:44

## 2025-01-09 RX ADMIN — VANCOMYCIN HYDROCHLORIDE 250 MILLIGRAM(S): 5 INJECTION, POWDER, LYOPHILIZED, FOR SOLUTION INTRAVENOUS at 07:26

## 2025-01-09 RX ADMIN — VANCOMYCIN HYDROCHLORIDE 1000 MILLIGRAM(S): 5 INJECTION, POWDER, LYOPHILIZED, FOR SOLUTION INTRAVENOUS at 09:21

## 2025-01-09 RX ADMIN — Medication 25 MILLIGRAM(S): at 22:00

## 2025-01-09 RX ADMIN — AMPICILLIN SODIUM AND SULBACTAM SODIUM 200 GRAM(S): 100; 50 INJECTION, POWDER, FOR SOLUTION INTRAVENOUS at 07:20

## 2025-01-09 RX ADMIN — AMPICILLIN SODIUM AND SULBACTAM SODIUM 200 GRAM(S): 100; 50 INJECTION, POWDER, FOR SOLUTION INTRAVENOUS at 22:01

## 2025-01-09 RX ADMIN — VANCOMYCIN HYDROCHLORIDE 300 MILLIGRAM(S): 5 INJECTION, POWDER, LYOPHILIZED, FOR SOLUTION INTRAVENOUS at 18:10

## 2025-01-09 RX ADMIN — TRIHEXYPHENIDYL HYDROCHLORIDE 2 MILLIGRAM(S): 5 TABLET ORAL at 17:35

## 2025-01-09 NOTE — H&P ADULT - ASSESSMENT
54M with h/o dystonia (for which he requires botox injections), presents to the ED with left 3rd finger pain and swelling, concerning for cellulitis, without improvement with po antibiotics. Admitted for IV antibiotics.

## 2025-01-09 NOTE — ED ADULT NURSE NOTE - OBJECTIVE STATEMENT
Pt presents to the ED with reports of pain and swelling to his left 3rd finger. Pt states he clipped his nails on 12/31 and began having pain the next day. Pt was seen and treated at urgent care. Pt completed Doxycycline yesterday and noticed new pain and swelling to his finger. Pt reports some pus expelled after soaking in warm water. Redness noted at the distal portion of finger.

## 2025-01-09 NOTE — H&P ADULT - PROBLEM SELECTOR PLAN 1
Patient with L 3rd digit cellulitis, completed course of doxycycline outpatient w/o significant improvement  - Xray shows Left third finger soft tissue prominence noted, no acute fracture or radiopaque foreign body  - hand surgeon Dr Shah consulted in the ED for possible intervention  - ID recs appreciated, continue w/ vancomycin (check trough) and unasyn  - Cx if possible   - Supportive care, warm soaks

## 2025-01-09 NOTE — H&P ADULT - HISTORY OF PRESENT ILLNESS
54M with h/o dystonia (for which he requires botox injections), presents to the ED with left 3rd finger pain and swelling. Patient states that on new years sd when he cut his nails, there was a small piece that he tried to pull off, which was stuck, leading to infection. He was on doxycycline for 1 week, doing warm soaks and did express some pus from the medial nail area. Patient finished the course yesterday, but states even prior to the last dose he noted his finger was swollen and he had difficulty moving it. Patient denies any fevers. No chest pain, shortness of breath, n/v, diarrhea. No other joints or fingers affected.     In the ED, T is 98.5, , /93, RR 20 satting 94% on room air. Patient received vanc 1g z1, unasyn 3gx1, benadryl 25mg X1   54M with h/o dystonia (for which he requires botox injections), presents to the ED with left 3rd finger pain and swelling. Patient states that on new years sd when he cut his nails, there was a small piece that he tried to pull off, which was stuck, leading to infection. He was on doxycycline for 1 week, doing warm soaks and did express some pus from the medial nail area. Patient finished the course yesterday, but states even prior to the last dose he noted his finger was swollen and he had difficulty moving it. Patient denies any fevers. No chest pain, shortness of breath, n/v, diarrhea. No other joints or fingers affected.     In the ED, T is 98.5, , /93, RR 20 satting 94% on room air. Patient received vanc 1g z1, unasyn 3gx1, benadryl 25mg X1. ID consulted

## 2025-01-09 NOTE — H&P ADULT - NSHPREVIEWOFSYSTEMS_GEN_ALL_CORE
REVIEW OF SYSTEMS:    CONSTITUTIONAL: No weakness, fevers or chills  EYES/ENT: No visual changes;  No vertigo or throat pain   NECK: No pain or stiffness  RESPIRATORY: No cough, wheezing, hemoptysis; No shortness of breath  CARDIOVASCULAR: No chest pain or palpitations  GASTROINTESTINAL: No abdominal pain; nausea, vomiting;  diarrhea or constipation. No hemetemesis, melena or hematochezia.  GENITOURINARY: No dysuria, frequency or hematuria  NEUROLOGICAL: dystonia of face/neck  MUSCULOSKELETAL: left 3rd finger swelling and pain and redness   SKIN: No itching, burning, rashes, or lesions   All other review of systems is negative unless indicated above.

## 2025-01-09 NOTE — ED ADULT NURSE NOTE - CHIEF COMPLAINT QUOTE
"I cut my finger nail and 2 days later it began to swell and pulsate. I went to urgent care and they prescribed me antibiotics, Tylenol and soaking"

## 2025-01-09 NOTE — CONSULT NOTE ADULT - SUBJECTIVE AND OBJECTIVE BOX
See dictated note.  Imp: Left hand and middle finger infx, likely deep.  Rec: Continue IV abx/ID consult/NPO after midnight tonight for probable I&D of left hand tomorrow.

## 2025-01-09 NOTE — CONSULT NOTE ADULT - ASSESSMENT
Pt is a 54M w/ PMHx of dystonia, c/o left 3rd finger pain and swelling, pt states on new years sd he cut his nails and there was a small piece that he tried to pull off and was stuck, the area became infected and he has been on doxycycline for 1 week, doing warm soaks and did express some pus from the medial nail area, finished the doxy yesterday, but states even prior to the last dose he noted his finger was swollen and he can't straighten it out, no fever    L 3rd finger Cellulitis  R/o underlying abscess    Recommendations:   C/w vancomycin  C/w unasyn  Hand c/s, need for intervention  --please send for cx if possible  Trend temps/WBC  Supportive care, warm soaks  Additional management per primary team    D/w Dr. Du  Infectious Diseases will follow. Please call with any questions.  Ewa Haines M.D.  Available on [employer] TEAMS -- *PREFERRED*  [facility] [phone]  For after 5 P.M. and weekends, please call [phone]

## 2025-01-09 NOTE — H&P ADULT - TIME BILLING
- Ordering, reviewing, and interpreting labs, testing, and imaging  - Independently obtaining a review of systems and performing a physical exam  - Reviewing consultant documentation/recommendations in addition to discussing plan of care with consultants  - Counselling and educating patient regarding interpretation of aforementioned items and plan of care

## 2025-01-09 NOTE — CONSULT NOTE ADULT - SUBJECTIVE AND OBJECTIVE BOX
[facility]  BRADY Dominique S. Shah, Y. Patel, G. [facility]  [phone]    ANNA WILBURN  54y, Male  [id number]    HPI--  HPI: 54y M with h/o dystonia, c/o left 3rd finger pain and swelling, pt states on new years sd he cut his nails and there was a small piece that he tried to pull off and was stuck, the area became infected and he has been on doxycycline for 1 week, doing warm soaks and did express some pus from the medial nail area, finished the doxy yesterday, but states even prior to the last dose he noted his finger was swollen and he can't straighten it out, no fever  Pt seen in the Ed reporting improvement after initial Abx dose      Active Medications--  acetaminophen     Tablet .. 650 milliGRAM(s) Oral every 6 hours PRN  aluminum hydroxide/magnesium hydroxide/simethicone Suspension 30 milliLiter(s) Oral every 4 hours PRN  melatonin 3 milliGRAM(s) Oral at bedtime PRN    Antimicrobials:     Immunologic:     ROS:  CONSTITUTIONAL: No fevers or chills. No weakness or headache. No weight changes.  EYES/ENT: No visual or hearing changes. No sore throat or throat pain .  NECK: No pain or stiffness  RESPIRATORY: No cough, wheezing, or hemoptysis. No shortness of breath  CARDIOVASCULAR: No chest pain or palpitations  GASTROINTESTINAL: No abdominal pain. No nausea or vomiting. No diarrhea or constipation.  GENITOURINARY: No dysuria, frequency or hematuria  NEUROLOGICAL: No numbness or weakness  SKIN: No itching or rashes  PSYCHIATRIC: Pleasant. Appropriate affect    Allergies: lidocaine (Unknown)    PMH -- Dystonia      PSH --   FH --   Social History --  EtOH: denies   Tobacco: denies   Drug Use: denies     Travel/Environmental/Occupational History:    Physical Exam--  Vital Signs Last 24 Hrs  T(F): 98.5 (09 Jan 2025 08:11), Max: 98.5 (09 Jan 2025 06:09)  HR: 89 (09 Jan 2025 08:11) (89 - 107)  BP: 134/84 (09 Jan 2025 08:11) (134/84 - 149/93)  RR: 14 (09 Jan 2025 08:11) (14 - 20)  SpO2: 96% (09 Jan 2025 08:11) (94% - 96%)  General: nontoxic-appearing, no acute distress  HEENT: NC/AT, EOMI  Lungs: no use of resp acc muscles  Heart: Regular rate and rhythm.   Abdomen: Soft. Nondistended. Nontender.   Extremities: L third finger swelling, improved ROM  Skin: Warm. Dry. Good turgor. No rash.     Laboratory & Imaging Data:  CBC:                       15.9   7.34  )-----------( 271      ( 09 Jan 2025 06:57 )             48.9     CMP: 01-09    140  |  102  |  12  ----------------------------<  266[H]  3.7   |  29  |  0.96    Ca    10.1      09 Jan 2025 06:57        Urinalysis Basic - ( 09 Jan 2025 06:57 )    Color: x / Appearance: x / SG: x / pH: x  Gluc: 266 mg/dL / Ketone: x  / Bili: x / Urobili: x   Blood: x / Protein: x / Nitrite: x   Leuk Esterase: x / RBC: x / WBC x   Sq Epi: x / Non Sq Epi: x / Bacteria: x        Microbiology: reviewed        Radiology: reviewed

## 2025-01-09 NOTE — ED PROVIDER NOTE - MUSCULOSKELETAL, MLM
left hand 3rd finger swollen throughout, no specific area of tenderness, pt unable to fully extend or flex finger, but appears in pain when attempting to extend, no tenderness in hand

## 2025-01-09 NOTE — ED PROVIDER NOTE - CLINICAL SUMMARY MEDICAL DECISION MAKING FREE TEXT BOX
pt appears to have developed paronychia after cutting nails about 1 wk ago, took 1 wk of doxycycline and did warm soaks, since yesterday the finger is more swollen and has decreased ROM, no paronychia/eponychia detected on exam, concern for deeper space infection, will start iv, iv antibiotics, imaging, d/w hand

## 2025-01-09 NOTE — H&P ADULT - NSHPPHYSICALEXAM_GEN_ALL_CORE
PHYSICAL EXAM:    Vital Signs Last 24 Hrs  T(C): 36.9 (09 Jan 2025 08:11), Max: 36.9 (09 Jan 2025 06:09)  T(F): 98.5 (09 Jan 2025 08:11), Max: 98.5 (09 Jan 2025 06:09)  HR: 89 (09 Jan 2025 08:11) (89 - 107)  BP: 134/84 (09 Jan 2025 08:11) (134/84 - 149/93)  BP(mean): --  RR: 14 (09 Jan 2025 08:11) (14 - 20)  SpO2: 96% (09 Jan 2025 08:11) (94% - 96%)    General: No acute distress.  HEENT: No scleral icterus or injection.    Neck: Supple.  Full ROM.  No JVD.  Heart: RRR.  Normal S1 and S2.  No murmurs, rubs, or gallops.   Lungs: CTAB. No wheezes, crackles, or rhonchi.    Abdomen: BS+, soft, NT/ND  Skin: Warm and dry.  No rashes.  Extremities: No edema, clubbing, or cyanosis.  2+ peripheral pulses b/l.  Musculoskeletal: L 3rd digit swelling, erythema, dec ROM, tender to palpation  Neuro: A&Ox3.  Moving all extremities

## 2025-01-09 NOTE — H&P ADULT - NSHPLABSRESULTS_GEN_ALL_CORE
EKG personally reviewed, NSR, QTc 437  CXR personally reviewed. CXR w/ cardiomegaly  Labs personally reviewed. No leukocytosis

## 2025-01-09 NOTE — H&P ADULT - PROBLEM SELECTOR PLAN 4
CXR shows large hiatal hernia noted. Some blunting of both CP angles. Borderline cardiomegaly. Slight   prominence to parahilar interstitial markings on this portable exam. Left shoulder prosthesis has appeared since previous exam. Follow-up suggested  - can f/u outpatient

## 2025-01-09 NOTE — ED PROVIDER NOTE - OBJECTIVE STATEMENT
54y M c/o left 3rd finger pain and swelling, pt states on new years sd he cut his nails and there was a small piece that he tried to pull off and was stuck, the area became infected and he has been on doxycycline for 1 week, doing warm soaks and did express some pus from the medial nail area, finished the doxy yesterday, but states even prior to the last dose he noted his finger was swollen and he can't straighten in out 54y M with h/o dystonia, c/o left 3rd finger pain and swelling, pt states on new years sd he cut his nails and there was a small piece that he tried to pull off and was stuck, the area became infected and he has been on doxycycline for 1 week, doing warm soaks and did express some pus from the medial nail area, finished the doxy yesterday, but states even prior to the last dose he noted his finger was swollen and he can't straighten it out, no fever    pt does not have a PCP, states it's been years since he last saw a PCP or had a routine check up

## 2025-01-10 LAB
ANION GAP SERPL CALC-SCNC: 11 MMOL/L — SIGNIFICANT CHANGE UP (ref 5–17)
BUN SERPL-MCNC: 11 MG/DL — SIGNIFICANT CHANGE UP (ref 7–23)
CALCIUM SERPL-MCNC: 9.6 MG/DL — SIGNIFICANT CHANGE UP (ref 8.4–10.5)
CHLORIDE SERPL-SCNC: 102 MMOL/L — SIGNIFICANT CHANGE UP (ref 96–108)
CO2 SERPL-SCNC: 27 MMOL/L — SIGNIFICANT CHANGE UP (ref 22–31)
CREAT SERPL-MCNC: 0.87 MG/DL — SIGNIFICANT CHANGE UP (ref 0.5–1.3)
EGFR: 101 ML/MIN/1.73M2 — SIGNIFICANT CHANGE UP
GLUCOSE SERPL-MCNC: 202 MG/DL — HIGH (ref 70–99)
HCT VFR BLD CALC: 46.2 % — SIGNIFICANT CHANGE UP (ref 39–50)
HGB BLD-MCNC: 14.5 G/DL — SIGNIFICANT CHANGE UP (ref 13–17)
MAGNESIUM SERPL-MCNC: 1.8 MG/DL — SIGNIFICANT CHANGE UP (ref 1.6–2.6)
MCHC RBC-ENTMCNC: 25 PG — LOW (ref 27–34)
MCHC RBC-ENTMCNC: 31.4 G/DL — LOW (ref 32–36)
MCV RBC AUTO: 79.7 FL — LOW (ref 80–100)
NRBC # BLD: 0 /100 WBCS — SIGNIFICANT CHANGE UP (ref 0–0)
PHOSPHATE SERPL-MCNC: 3.4 MG/DL — SIGNIFICANT CHANGE UP (ref 2.5–4.5)
PLATELET # BLD AUTO: 245 K/UL — SIGNIFICANT CHANGE UP (ref 150–400)
POTASSIUM SERPL-MCNC: 3.5 MMOL/L — SIGNIFICANT CHANGE UP (ref 3.5–5.3)
POTASSIUM SERPL-SCNC: 3.5 MMOL/L — SIGNIFICANT CHANGE UP (ref 3.5–5.3)
RBC # BLD: 5.8 M/UL — SIGNIFICANT CHANGE UP (ref 4.2–5.8)
RBC # FLD: 13.8 % — SIGNIFICANT CHANGE UP (ref 10.3–14.5)
SODIUM SERPL-SCNC: 140 MMOL/L — SIGNIFICANT CHANGE UP (ref 135–145)
VANCOMYCIN TROUGH SERPL-MCNC: 9.8 UG/ML — LOW (ref 10–20)
WBC # BLD: 7.12 K/UL — SIGNIFICANT CHANGE UP (ref 3.8–10.5)
WBC # FLD AUTO: 7.12 K/UL — SIGNIFICANT CHANGE UP (ref 3.8–10.5)

## 2025-01-10 PROCEDURE — 99232 SBSQ HOSP IP/OBS MODERATE 35: CPT

## 2025-01-10 RX ORDER — OMEPRAZOLE MAGNESIUM 20 MG/1
1 CAPSULE, DELAYED RELEASE ORAL
Refills: 0 | DISCHARGE

## 2025-01-10 RX ORDER — DIPHENHYDRAMINE HCL 25 MG
1 TABLET ORAL
Refills: 0 | DISCHARGE

## 2025-01-10 RX ORDER — VANCOMYCIN HYDROCHLORIDE 5 G/100ML
1500 INJECTION, POWDER, LYOPHILIZED, FOR SOLUTION INTRAVENOUS ONCE
Refills: 0 | Status: COMPLETED | OUTPATIENT
Start: 2025-01-10 | End: 2025-01-10

## 2025-01-10 RX ORDER — OXYCODONE HCL 15 MG
5 TABLET ORAL ONCE
Refills: 0 | Status: DISCONTINUED | OUTPATIENT
Start: 2025-01-10 | End: 2025-01-10

## 2025-01-10 RX ORDER — TRIHEXYPHENIDYL HYDROCHLORIDE 5 MG/1
1 TABLET ORAL
Refills: 0 | DISCHARGE

## 2025-01-10 RX ORDER — VANCOMYCIN HYDROCHLORIDE 5 G/100ML
INJECTION, POWDER, LYOPHILIZED, FOR SOLUTION INTRAVENOUS
Refills: 0 | Status: DISCONTINUED | OUTPATIENT
Start: 2025-01-10 | End: 2025-01-11

## 2025-01-10 RX ORDER — ONDANSETRON 4 MG/1
4 TABLET ORAL ONCE
Refills: 0 | Status: DISCONTINUED | OUTPATIENT
Start: 2025-01-10 | End: 2025-01-10

## 2025-01-10 RX ORDER — VANCOMYCIN HYDROCHLORIDE 5 G/100ML
1500 INJECTION, POWDER, LYOPHILIZED, FOR SOLUTION INTRAVENOUS EVERY 12 HOURS
Refills: 0 | Status: DISCONTINUED | OUTPATIENT
Start: 2025-01-11 | End: 2025-01-11

## 2025-01-10 RX ORDER — AMPICILLIN SODIUM AND SULBACTAM SODIUM 100; 50 MG/ML; MG/ML
3 INJECTION, POWDER, FOR SOLUTION INTRAVENOUS EVERY 6 HOURS
Refills: 0 | Status: DISCONTINUED | OUTPATIENT
Start: 2025-01-10 | End: 2025-01-11

## 2025-01-10 RX ORDER — HYDROMORPHONE HCL 4 MG
0.5 TABLET ORAL
Refills: 0 | Status: DISCONTINUED | OUTPATIENT
Start: 2025-01-10 | End: 2025-01-10

## 2025-01-10 RX ORDER — HYDROMORPHONE HCL 4 MG
1 TABLET ORAL
Refills: 0 | Status: DISCONTINUED | OUTPATIENT
Start: 2025-01-10 | End: 2025-01-10

## 2025-01-10 RX ORDER — PROMETHAZINE HCL 25 MG
6.25 SUPPOSITORY, RECTAL RECTAL ONCE
Refills: 0 | Status: COMPLETED | OUTPATIENT
Start: 2025-01-10 | End: 2025-01-10

## 2025-01-10 RX ORDER — SODIUM CHLORIDE 9 MG/ML
1000 INJECTION, SOLUTION INTRAVENOUS
Refills: 0 | Status: DISCONTINUED | OUTPATIENT
Start: 2025-01-10 | End: 2025-01-10

## 2025-01-10 RX ADMIN — VANCOMYCIN HYDROCHLORIDE 300 MILLIGRAM(S): 5 INJECTION, POWDER, LYOPHILIZED, FOR SOLUTION INTRAVENOUS at 05:44

## 2025-01-10 RX ADMIN — ACETAMINOPHEN 650 MILLIGRAM(S): 80 SOLUTION/ DROPS ORAL at 02:23

## 2025-01-10 RX ADMIN — SODIUM CHLORIDE 75 MILLILITER(S): 9 INJECTION, SOLUTION INTRAVENOUS at 11:36

## 2025-01-10 RX ADMIN — ACETAMINOPHEN 650 MILLIGRAM(S): 80 SOLUTION/ DROPS ORAL at 18:11

## 2025-01-10 RX ADMIN — AMPICILLIN SODIUM AND SULBACTAM SODIUM 200 GRAM(S): 100; 50 INJECTION, POWDER, FOR SOLUTION INTRAVENOUS at 16:26

## 2025-01-10 RX ADMIN — AMPICILLIN SODIUM AND SULBACTAM SODIUM 200 GRAM(S): 100; 50 INJECTION, POWDER, FOR SOLUTION INTRAVENOUS at 20:45

## 2025-01-10 RX ADMIN — VANCOMYCIN HYDROCHLORIDE 300 MILLIGRAM(S): 5 INJECTION, POWDER, LYOPHILIZED, FOR SOLUTION INTRAVENOUS at 18:11

## 2025-01-10 RX ADMIN — ACETAMINOPHEN 650 MILLIGRAM(S): 80 SOLUTION/ DROPS ORAL at 03:23

## 2025-01-10 RX ADMIN — ACETAMINOPHEN 650 MILLIGRAM(S): 80 SOLUTION/ DROPS ORAL at 10:32

## 2025-01-10 RX ADMIN — Medication 25 MILLIGRAM(S): at 20:45

## 2025-01-10 RX ADMIN — Medication 200 MILLIGRAM(S): at 10:26

## 2025-01-10 RX ADMIN — AMPICILLIN SODIUM AND SULBACTAM SODIUM 200 GRAM(S): 100; 50 INJECTION, POWDER, FOR SOLUTION INTRAVENOUS at 04:54

## 2025-01-10 RX ADMIN — TRIHEXYPHENIDYL HYDROCHLORIDE 2 MILLIGRAM(S): 5 TABLET ORAL at 05:45

## 2025-01-10 RX ADMIN — ACETAMINOPHEN 650 MILLIGRAM(S): 80 SOLUTION/ DROPS ORAL at 11:45

## 2025-01-10 RX ADMIN — PANTOPRAZOLE 40 MILLIGRAM(S): 40 TABLET, DELAYED RELEASE ORAL at 05:45

## 2025-01-10 RX ADMIN — TRIHEXYPHENIDYL HYDROCHLORIDE 2 MILLIGRAM(S): 5 TABLET ORAL at 18:11

## 2025-01-10 RX ADMIN — ACETAMINOPHEN 650 MILLIGRAM(S): 80 SOLUTION/ DROPS ORAL at 18:48

## 2025-01-10 NOTE — PROGRESS NOTE ADULT - PROBLEM SELECTOR PLAN 1
Patient with L 3rd digit cellulitis, completed course of doxycycline outpatient w/o significant improvement  - Xray shows Left third finger soft tissue prominence noted, no acute fracture or radiopaque foreign body  - hand surgeon Dr Shah following, s/p OR on 1/10  - wound cultures from OR sent  - ID recs appreciated, continue w/ vancomycin (check trough) and unasyn  - Supportive care

## 2025-01-10 NOTE — CASE MANAGEMENT PROGRESS NOTE - NSCMPROGRESSNOTE_GEN_ALL_CORE
CM called DR. Blue he stated he will be here tomorrow Saturday 1/11/25 to see patient and remove drain. As per DR. Blue patient does not need any homecare services. Will continue to follow patient. CM called DR. Blue phone number [phone] he stated he will be here tomorrow Saturday 1/11/25 to see patient and remove drain. As per DR. Blue patient does not need any homecare services. Will continue to follow patient.

## 2025-01-10 NOTE — CARE COORDINATION ASSESSMENT. - NSDCPLANSERVICES_GEN_ALL_CORE
DX:54M w/ PMHx of dystonia, c/o left 3rd finger pain and swelling, pt states on new years sd he cut his nails and there was a small piece that he tried to pull off and was stuck, the area became infected and he has been on doxycycline for 1 week, doing warm soaks and did express some pus from the medial nail area, finished the doxy yesterday, but states even prior to the last dose he noted his finger was swollen and he can't straighten it out, no fever.      Patient Receiving IV Vanco.   Patient has a 5 Nigerien feeding tube drain on left hand.    CM met with patient at bedside. Patient alert times 3.  Patient stated has 4 steps to get in the house and 12 steps to get to bedroom. Patient  spoke with Susanne family member phone number [phone]. Susanne stated that patient is due for Botox injection for his eye and epidural for his back CM made DR. Du aware stated it should be fine for him to get.       CM verified:     PCP: Patient stated has NO PCP offered resources.  Pharmacy: [facility] phone number [phone].   Insurance: Aetna  : NO.     CM offered homecare services patient declined will make provider aware. CM will continue to follow patient./No Anticipated Discharge Needs

## 2025-01-10 NOTE — CARE COORDINATION ASSESSMENT. - OTHER PERTINENT DISCHARGE PLANNING INFORMATION:
54M w/ PMHx of dystonia, c/o left 3rd finger pain and swelling, pt states on new years sd he cut his nails and there was a small piece that he tried to pull off and was stuck, the area became infected and he has been on doxycycline for 1 week, doing warm soaks and did express some pus from the medial nail area, finished the doxy yesterday, but states even prior to the last dose he noted his finger was swollen and he can't straighten it out, no fever. Met patient at bedside.  Explained role of CM, verbalized understanding. Pt was made aware a CM will remain available through hospitalization.  Contact information given in discharge/ transitions resource folder.

## 2025-01-10 NOTE — PRE-OP CHECKLIST - DNR CLARIFICATION FORM COMPLETED
.Patient aware that the provider is out of the office and is okay waiting until they return for the message to be addressed.   Thank you    Wife called asking for Standing order's for lab work  please be faxed to [facility] [place] in [place].  Fax # is [phone]  Next appointment with Dr Curtis is scheduled for   1/18/2019        n/a

## 2025-01-10 NOTE — CARE COORDINATION ASSESSMENT. - NSCAREPROVIDERS_GEN_ALL_CORE_FT
CARE PROVIDERS:  Accepting Physician: Matthew Coats  Administration: Leonard Du  Admitting: Matthew Coats  Attending: Leonard Du  Consultant: Michael Blue  Consultant: Alicia Bonner  Covering Team: Ja Vargas ED Attending: Philly Jama ED Nurse: Hardik Quesada  Infection Control: Wanda Cope  Nurse: Suri Noble  Nurse: Nisha Ricks  Nurse: Molly Wu  Nurse: Kathryn Fisher  Nurse: Lauren Centeno  Nurse: Baron Peck  Nurse: Willam Pierre  Nurse: Mariely Bradford  Override: Frannie Fierro  Override: Sweetie Luna  PCA/Nursing Assistant: Steven Cueto  Primary Team: Red Singh  Primary Team: Matthew Coats  Respiratory Therapy: Mir Birmingham  : Sascha Pollard  : Solange Chakraborty  Team: [facility] Hospitalists, Team  UR// Supp. Assoc.: Debra Sanchez

## 2025-01-10 NOTE — CARE COORDINATION ASSESSMENT. - PRO ARRIVE FROM
DX:54M w/ PMHx of dystonia, c/o left 3rd finger pain and swelling, pt states on new years sd he cut his nails and there was a small piece that he tried to pull off and was stuck, the area became infected and he has been on doxycycline for 1 week, doing warm soaks and did express some pus from the medial nail area, finished the doxy yesterday, but states even prior to the last dose he noted his finger was swollen and he can't straighten it out, no fever.      Patient Receiving IV Vanco.   Patient has a 5 Niuean feeding tube drain on left hand.    CM met with patient at bedside. Patient alert times 3.  Patient stated has 4 steps to get in the house and 12 steps to get to bedroom. Patient  spoke with Susanne family member phone number [phone]. Susanne stated that patient is due for Botox injection for his eye and epidural for his back CM made DR. Du aware stated it should be fine for him to get.       CM verified:     PCP: Patient stated has NO PCP offered resources.  Pharmacy: [facility] phone number [phone].   Insurance: Aetna  : NO.     CM offered homecare services patient declined will make provider aware. CM will continue to follow patient./home

## 2025-01-10 NOTE — PRE-OP CHECKLIST - NEEDLE GAUGE
Airway patent, Nasal mucosa clear. Mouth with normal mucosa. Throat has no vesicles, no oropharyngeal exudates and uvula is midline. 20

## 2025-01-11 ENCOUNTER — TRANSCRIPTION ENCOUNTER (OUTPATIENT)
Age: 55
End: 2025-01-11

## 2025-01-11 VITALS
HEART RATE: 72 BPM | DIASTOLIC BLOOD PRESSURE: 69 MMHG | RESPIRATION RATE: 18 BRPM | SYSTOLIC BLOOD PRESSURE: 113 MMHG | TEMPERATURE: 98 F | OXYGEN SATURATION: 93 %

## 2025-01-11 PROCEDURE — 36415 COLL VENOUS BLD VENIPUNCTURE: CPT

## 2025-01-11 PROCEDURE — 96366 THER/PROPH/DIAG IV INF ADDON: CPT

## 2025-01-11 PROCEDURE — 85730 THROMBOPLASTIN TIME PARTIAL: CPT

## 2025-01-11 PROCEDURE — 96365 THER/PROPH/DIAG IV INF INIT: CPT

## 2025-01-11 PROCEDURE — 96368 THER/DIAG CONCURRENT INF: CPT

## 2025-01-11 PROCEDURE — 83605 ASSAY OF LACTIC ACID: CPT

## 2025-01-11 PROCEDURE — 83735 ASSAY OF MAGNESIUM: CPT

## 2025-01-11 PROCEDURE — 80048 BASIC METABOLIC PNL TOTAL CA: CPT

## 2025-01-11 PROCEDURE — 80202 ASSAY OF VANCOMYCIN: CPT

## 2025-01-11 PROCEDURE — 85027 COMPLETE CBC AUTOMATED: CPT

## 2025-01-11 PROCEDURE — 71045 X-RAY EXAM CHEST 1 VIEW: CPT

## 2025-01-11 PROCEDURE — 99285 EMERGENCY DEPT VISIT HI MDM: CPT

## 2025-01-11 PROCEDURE — 85610 PROTHROMBIN TIME: CPT

## 2025-01-11 PROCEDURE — 87070 CULTURE OTHR SPECIMN AEROBIC: CPT

## 2025-01-11 PROCEDURE — 85025 COMPLETE CBC W/AUTO DIFF WBC: CPT

## 2025-01-11 PROCEDURE — 73140 X-RAY EXAM OF FINGER(S): CPT

## 2025-01-11 PROCEDURE — 93005 ELECTROCARDIOGRAM TRACING: CPT

## 2025-01-11 PROCEDURE — 99239 HOSP IP/OBS DSCHRG MGMT >30: CPT

## 2025-01-11 PROCEDURE — 84100 ASSAY OF PHOSPHORUS: CPT

## 2025-01-11 RX ORDER — DOXYCYCLINE MONOHYDRATE 100 MG
1 TABLET ORAL
Qty: 14 | Refills: 0
Start: 2025-01-11 | End: 2025-01-17

## 2025-01-11 RX ORDER — AMOXICILLIN/POTASSIUM CLAV 875-125 MG
875 TABLET ORAL
Qty: 14 | Refills: 0
Start: 2025-01-11 | End: 2025-01-17

## 2025-01-11 RX ADMIN — AMPICILLIN SODIUM AND SULBACTAM SODIUM 200 GRAM(S): 100; 50 INJECTION, POWDER, FOR SOLUTION INTRAVENOUS at 09:55

## 2025-01-11 RX ADMIN — AMPICILLIN SODIUM AND SULBACTAM SODIUM 200 GRAM(S): 100; 50 INJECTION, POWDER, FOR SOLUTION INTRAVENOUS at 02:04

## 2025-01-11 RX ADMIN — VANCOMYCIN HYDROCHLORIDE 300 MILLIGRAM(S): 5 INJECTION, POWDER, LYOPHILIZED, FOR SOLUTION INTRAVENOUS at 06:18

## 2025-01-11 RX ADMIN — ACETAMINOPHEN 650 MILLIGRAM(S): 80 SOLUTION/ DROPS ORAL at 09:56

## 2025-01-11 RX ADMIN — ACETAMINOPHEN 650 MILLIGRAM(S): 80 SOLUTION/ DROPS ORAL at 09:55

## 2025-01-11 RX ADMIN — TRIHEXYPHENIDYL HYDROCHLORIDE 2 MILLIGRAM(S): 5 TABLET ORAL at 06:18

## 2025-01-11 RX ADMIN — PANTOPRAZOLE 40 MILLIGRAM(S): 40 TABLET, DELAYED RELEASE ORAL at 06:18

## 2025-01-11 NOTE — CASE MANAGEMENT PROGRESS NOTE - NSCMPROGRESSNOTE_GEN_ALL_CORE
54M w/ PMHx of dystonia, c/o left 3rd finger pain and swelling.   Per MD: d/c today to home seen by Dr Blue and ID cleared for home with po AB and given wound care supplies and instruction on how to care for hand.  Per Dr Blue pt. does not need home care services just a dry dressing until ready for a band aid that he explained to the patient.  pt. will follow up in his office Monday 1/13/25. Pt. states his family lives local and he will have them assist if he has any problem with doing the hand care.  States his car is at the hospital, if allowed he may drive himself home.  Will get d/c instruction from primary nurse Viviana.     CM met with pt. this morning about 9:45 am and introduced self and role of CM for transition planning.  pt. verbalized good understanding and was able to teach back what Dr. blue told him abiooiut finger care.  Patient stated has 4 steps to get in the house and 12 steps to get to bedroom.Declined offer for CM to contact family.    Pt. in agreement with home today with MD follow up on 1/13/25.  CM available treatment team aware.

## 2025-01-11 NOTE — DISCHARGE NOTE NURSING/CASE MANAGEMENT/SOCIAL WORK - FINANCIAL ASSISTANCE
[facility] provides services at a reduced cost to those who are determined to be eligible through [facility]’s financial assistance program. Information regarding [facility]’s financial assistance program can be found by going to [email or web address] or by calling [phone].

## 2025-01-11 NOTE — PROGRESS NOTE ADULT - SUBJECTIVE AND OBJECTIVE BOX
[facility]  BRADY Dominique Y. Patel, S. Shah, G. [facility]  [phone]    Name: ANNA WILBURN  Age: 54y  Gender: Male  MRN: [id number]    Interval History:  Pt seen in pre-op  No acute overnight events.   Notes reviewed    Antibiotics:  ampicillin/sulbactam  IVPB 3 Gram(s) IV Intermittent every 6 hours  vancomycin  IVPB 1500 milliGRAM(s) IV Intermittent every 12 hours      Medications:  acetaminophen     Tablet .. 650 milliGRAM(s) Oral every 6 hours PRN  aluminum hydroxide/magnesium hydroxide/simethicone Suspension 30 milliLiter(s) Oral every 4 hours PRN  ampicillin/sulbactam  IVPB 3 Gram(s) IV Intermittent every 6 hours  diphenhydrAMINE 25 milliGRAM(s) Oral every 6 hours PRN  melatonin 3 milliGRAM(s) Oral at bedtime PRN  pantoprazole    Tablet 40 milliGRAM(s) Oral before breakfast  trihexyphenidyl 2 milliGRAM(s) Oral two times a day  vancomycin  IVPB 1500 milliGRAM(s) IV Intermittent every 12 hours      Review of Systems:  A 10-point review of systems was obtained.  Review of systems otherwise negative except as previously noted.    Allergies: No Known Allergies    For details regarding the patient's past medical history, social history, family history, and other miscellaneous elements, please refer the initial infectious diseases consultation and/or the admitting history and physical examination for this admission.    Objective:  Vitals:   T(C): 36.7 (01-10-25 @ 08:29), Max: 36.9 (01-09-25 @ 17:03)  HR: 98 (01-10-25 @ 08:29) (57 - 98)  BP: 147/98 (01-10-25 @ 08:29) (129/70 - 154/83)  RR: 23 (01-10-25 @ 08:29) (18 - 23)  SpO2: 98% (01-10-25 @ 08:29) (94% - 100%)    Physical Examination:  General: no acute distress  HEENT: NC/AT, EOMI  Cardio: RRR  Resp: no use resp acc muscles  Abd: soft, NT, ND  Ext: L finger swelling decreased and ROM improved  Skin: warm, dry, no visible rash      Laboratory Studies:  CBC:                       14.5   7.12  )-----------( 245      ( 10 [place] 2025 08:12 )             46.2     CMP: 01-09    141  |  102  |  9   ----------------------------<  218[H]  3.8   |  31  |  0.92    Ca    10.4      09 Jan 2025 12:00  Phos  3.4     01-09  Mg     1.8     01-09        Urinalysis Basic - ( 09 Jan 2025 12:00 )    Color: x / Appearance: x / SG: x / pH: x  Gluc: 218 mg/dL / Ketone: x  / Bili: x / Urobili: x   Blood: x / Protein: x / Nitrite: x   Leuk Esterase: x / RBC: x / WBC x   Sq Epi: x / Non Sq Epi: x / Bacteria: x        Microbiology: reviewed        Radiology: reviewed      
Appreciate all efforts.  Still moderately tender with only minimal improvement.  Options/alts/risks/cxs discussed.  Will proceed with I&D.
Appreciate all efforts.  less pain.  Op findings discussed.  VSS  Alert and oriented.  Left hand/middle finger and adjacent fingers with much improved swelling, erythema and tenderness.  The drain was pulled and deep area redrained with scant minimal drainage noted, irrigated and redressed, and with minimal to no bleefing.  N&V intact and much improved ROM.  Reassured.  Discussed with pt and Dr Haines (ID).  Consider discharge home soon, on empiric combination abx, eg Aug+Doxy, and close f/u as outpt next wk in my office, and Dr Haines concurs, and if otherwise clinically stable.  May shower and wash and apply clean dressing at home.  No further acute plastic surgical intervention at this time.  Thank you.
[facility]  BRADY Dominique Y. Patel, S. Shah, G. [facility]  [phone]    Name: VERONIKAFLOYDANNA ASHRAF  Age: 54y  Gender: Male  MRN: [id number]    Chart reviewed    S/p OR yesterday cx sent      
PROGRESS NOTE:   Authored by Dr. Leonard Du MD, Available on MS Teams    Patient is a 54y old  Male who presents with a chief complaint of finger infection (10 [place]anako 2025 09:03)      SUBJECTIVE / OVERNIGHT EVENTS: Patient went to the OR this am. Currently denies pain. No chest pain or shortness of breath     ADDITIONAL REVIEW OF SYSTEMS:    MEDICATIONS  (STANDING):  ampicillin/sulbactam  IVPB 3 Gram(s) IV Intermittent every 6 hours  lactated ringers. 1000 milliLiter(s) (75 mL/Hr) IV Continuous <Continuous>  pantoprazole    Tablet 40 milliGRAM(s) Oral before breakfast  trihexyphenidyl 2 milliGRAM(s) Oral two times a day  vancomycin  IVPB      vancomycin  IVPB 1500 milliGRAM(s) IV Intermittent once    MEDICATIONS  (PRN):  acetaminophen     Tablet .. 650 milliGRAM(s) Oral every 6 hours PRN Temp greater or equal to 38C (100.4F), Mild Pain (1 - 3), Moderate Pain (4 - 6)  aluminum hydroxide/magnesium hydroxide/simethicone Suspension 30 milliLiter(s) Oral every 4 hours PRN Dyspepsia  diphenhydrAMINE 25 milliGRAM(s) Oral every 6 hours PRN Rash and/or Itching  HYDROmorphone  Injectable 0.5 milliGRAM(s) IV Push every 10 minutes PRN Moderate Pain (4 - 6)  HYDROmorphone  Injectable 1 milliGRAM(s) IV Push every 10 minutes PRN Severe Pain (7 - 10)  melatonin 3 milliGRAM(s) Oral at bedtime PRN Insomnia  ondansetron Injectable 4 milliGRAM(s) IV Push once PRN Nausea and/or Vomiting  oxycodone    5 mG/acetaminophen 325 mG 1 Tablet(s) Oral every 4 hours PRN Severe Pain (7 - 10)  oxyCODONE    IR 5 milliGRAM(s) Oral once PRN Moderate Pain (4 - 6)      CAPILLARY BLOOD GLUCOSE        I&O's Summary    09 Jan 2025 07:01  -  10 [place]an[place] 2025 07:00  --------------------------------------------------------  IN: 0 mL / OUT: 2 mL / NET: -2 mL    10 [place]ako 2025 07:01  -  10 Guanako 2025 12:56  --------------------------------------------------------  IN: 815 mL / OUT: 0 mL / NET: 815 mL        PHYSICAL EXAM:  Vital Signs Last 24 Hrs  T(C): 36.6 (10 [place] 2025 10:04), Max: 36.9 (09 Jan 2025 17:03)  T(F): 97.8 (10 [place] 2025 10:04), Max: 98.4 (09 Jan 2025 17:03)  HR: 66 (10 [place] 2025 11:45) (57 - 98)  BP: 122/84 (10 Guanako 2025 11:45) (122/84 - 154/86)  BP(mean): --  RR: 15 (10 [place] 2025 11:45) (14 - 23)  SpO2: 97% (10 [place] 2025 11:45) (92% - 100%)    Parameters below as of 10 Guanako 2025 11:45  Patient On (Oxygen Delivery Method): room air        CONSTITUTIONAL: NAD  RESPIRATORY: Normal respiratory effort; lungs are clear to auscultation bilaterally  CARDIOVASCULAR: Regular rate and rhythm, normal S1 and S2, no murmur/rub/gallop; No lower extremity edema  ABDOMEN: Nontender to palpation, normoactive bowel sounds, no rebound/guarding  MUSCLOSKELETAL: no clubbing or cyanosis of digits; L hand in dressing  PSYCH: A+O to person, place, and time; affect appropriate    LABS:                        14.5   7.12  )-----------( 245      ( 10 [place]anako 2025 08:12 )             46.2     01-10    140  |  102  |  11  ----------------------------<  202[H]  3.5   |  27  |  0.87    Ca    9.6      10 [place] 2025 08:12  Phos  3.4     01-10  Mg     1.8     01-10      PT/INR - ( 09 Jan 2025 07:20 )   PT: 12.9 sec;   INR: 1.11 ratio         PTT - ( 09 Jan 2025 07:20 )  PTT:31.0 sec      Urinalysis Basic - ( 10 [place] 2025 08:12 )    Color: x / Appearance: x / SG: x / pH: x  Gluc: 202 mg/dL / Ketone: x  / Bili: x / Urobili: x   Blood: x / Protein: x / Nitrite: x   Leuk Esterase: x / RBC: x / WBC x   Sq Epi: x / Non Sq Epi: x / Bacteria: x

## 2025-01-11 NOTE — CHART NOTE - NSCHARTNOTEFT_GEN_A_CORE
To Whom It May Concern;    Mr. ANNA WILBURN was being treated here at [facility].  He can resume his work activities with no restrictions starting on Tuesday 1/14.  Please contact me with any questions or concerns.  Thank you.        Sincerely,          Ja Vargas  Hospitalist  [facility]  [address] [address]  e: [email or web address]  c: [phone]

## 2025-01-11 NOTE — DISCHARGE NOTE PROVIDER - NSDCMRMEDTOKEN_GEN_ALL_CORE_FT
amoxicillin-clavulanate 875 mg-125 mg oral tablet: 875 milligram(s) orally 2 times a day  diphenhydrAMINE 25 mg oral tablet: 1 tab(s) orally 2 times a day as needed for  allergy symptoms  doxycycline monohydrate 100 mg oral tablet: 1 tab(s) orally 2 times a day  PriLOSEC 40 mg oral delayed release capsule: 1 cap(s) orally 2 times a day  trihexyphenidyl 2 mg oral tablet: 1 tab(s) orally 2 times a day

## 2025-01-11 NOTE — DISCHARGE NOTE PROVIDER - CARE PROVIDER_API CALL
Michael Blue  Plastic Surgery  [address] [place] [address], [address]  [place] [address]  Phone: [phone]  Fax: [phone]  Follow Up Time:     Sandy Cavazos  Infectious Disease  [address] [place] [address], [address]  [place] [address]  Phone: [phone]  Fax: [phone]  Follow Up Time:

## 2025-01-11 NOTE — DISCHARGE NOTE NURSING/CASE MANAGEMENT/SOCIAL WORK - PATIENT PORTAL LINK FT
You can access the FollowMyHealth Patient Portal offered by [facility] by registering at the following website: [email or web address]. By joining [employer]’s FollowMyHealth portal, you will also be able to view your health information using other applications (apps) compatible with our system.

## 2025-01-11 NOTE — DISCHARGE NOTE PROVIDER - HOSPITAL COURSE
HPI:  "54M with h/o dystonia (for which he requires botox injections), presents to the ED with left 3rd finger pain and swelling. Patient states that on new years sd when he cut his nails, there was a small piece that he tried to pull off, which was stuck, leading to infection. He was on doxycycline for 1 week, doing warm soaks and did express some pus from the medial nail area. Patient finished the course yesterday, but states even prior to the last dose he noted his finger was swollen and he had difficulty moving it. Patient denies any fevers. No chest pain, shortness of breath, n/v, diarrhea. No other joints or fingers affected. "      Patient had an I&D on 1/10 with purulent drainage.  Patient with improved symptoms except from some soreness at the excision site.  Patient's hand remained stable and was evaluated by .  Cleared for discharge by him.  ID was consulted and eventually recommended DC on Augmentin and Doxycycline.  To follow up with ID And plstics as outpatient.

## 2025-01-11 NOTE — DISCHARGE NOTE PROVIDER - NSDCFUSCHEDAPPT_GEN_ALL_CORE_FT
Miles Guardado  [facility]  PHYSMED OP [address]  Scheduled Appointment: 01/17/2025

## 2025-01-11 NOTE — DISCHARGE NOTE PROVIDER - NSDCCPCAREPLAN_GEN_ALL_CORE_FT
PRINCIPAL DISCHARGE DIAGNOSIS  Diagnosis: Cellulitis  Assessment and Plan of Treatment: You had an infection of your finger and had an incision and drainage of the infection.  PLEASE continue taking the antibiotics as directed (AUGMENTIN 875/125mg twice a day and DOXYCYCLINE 100mg twice a day, both for 7 days).  PLEASE follow up with Dr. Shah and the ID physician.  PLEASE return to the ED with any worsening symptoms such as fevers, worsening pain, discharge, difficulty moving your fingers, swelling, redness, etc.

## 2025-01-11 NOTE — DISCHARGE NOTE PROVIDER - ATTENDING DISCHARGE PHYSICAL EXAMINATION:
PHYSICAL EXAM:  Vital Signs Last 24 Hrs  T(C): 36.9 (11 Jan 2025 05:19), Max: 36.9 (10 [place] 2025 15:05)  T(F): 98.5 (11 Jan 2025 05:19), Max: 98.5 (11 Jan 2025 05:19)  HR: 72 (11 Jan 2025 05:19) (66 - 93)  BP: 113/69 (11 Jan 2025 05:19) (111/69 - 122/84)  BP(mean): --  RR: 18 (11 Jan 2025 05:19) (15 - 18)  SpO2: 93% (11 Jan 2025 05:19) (93% - 97%)    Parameters below as of 10 [place] 2025 15:05  Patient On (Oxygen Delivery Method): room air    S:  doing well this morning with no acute complaints except from some soreness in the hand    GENERAL:     age appropriate, in NAD  HEAD:     atraumatic, normocephalic  EYES:     EOMI, conjunctiva and sclera clear  RESPIRATORY:     clear to auscultation bilaterally, no rales or rhonchi or wheezing or rubs  CARDIOVASCULAR:     regular rate and rhythm, no murmurs or rubs or gallops  GASTROINTESTINAL:     soft, nontender, nondistended, bowel sounds present  EXTREMITIES:     no clubbing or cyanosis or edema  MUSCULOSKELETAL:     left hand in ace bandage, fingers warm, no sensory loss  NERVOUS SYSTEM:     involuntary facial movements  PSYCH:     appropriate, alert and orientated x3, good concentration

## 2025-01-11 NOTE — PROGRESS NOTE ADULT - ASSESSMENT
Pt is a 54M w/ PMHx of dystonia, c/o left 3rd finger pain and swelling, pt states on new years sd he cut his nails and there was a small piece that he tried to pull off and was stuck, the area became infected and he has been on doxycycline for 1 week, doing warm soaks and did express some pus from the medial nail area, finished the doxy yesterday, but states even prior to the last dose he noted his finger was swollen and he can't straighten it out, no fever    L 3rd finger Cellulitis  R/o underlying abscess  Seen by plastics--OR today    Recommendations:   C/w vancomycin  C/w unasyn  OR today --please send for cx  Trend temps/WBC  Additional management per primary team  Final recs to follow pending above    Infectious Diseases will follow. Please call with any questions.  Ewa Haines M.D.  Available on [employer] TEAMS -- *PREFERRED*  [facility] [phone]  For after 5 P.M. and weekends, please call [phone]  
Ok to go on doxycycline 100mg BID and augmentin 875/125mg BID to complete x7 day course  Pt can call [phone] to make f/u Dr. Cavazos    D/w Dr. Blue  D/w Dr. Vargas  Infectious Diseases will follow. Please call with any questions.  Ewa Haines M.D.  Available on [employer] TEAMS -- *PREFERRED*  [facility] [phone]  For after 5 P.M. and weekends, please call [phone]  
54M with h/o dystonia (for which he requires botox injections), presents to the ED with left 3rd finger pain and swelling, concerning for cellulitis, without improvement with po antibiotics. Admitted for IV antibiotics.

## 2025-01-15 LAB
CULTURE RESULTS: NO GROWTH — SIGNIFICANT CHANGE UP
SPECIMEN SOURCE: SIGNIFICANT CHANGE UP

## 2025-01-28 PROBLEM — G24.9 DYSTONIA, UNSPECIFIED: Chronic | Status: ACTIVE | Noted: 2025-01-09

## 2025-01-31 ENCOUNTER — OUTPATIENT (OUTPATIENT)
Dept: OUTPATIENT SERVICES | Facility: HOSPITAL | Age: 55
LOS: 1 days | End: 2025-01-31
Payer: COMMERCIAL

## 2025-01-31 ENCOUNTER — APPOINTMENT (OUTPATIENT)
Dept: PHYSICAL MEDICINE AND REHAB | Facility: CLINIC | Age: 55
End: 2025-01-31
Payer: COMMERCIAL

## 2025-01-31 DIAGNOSIS — M54.16 RADICULOPATHY, LUMBAR REGION: ICD-10-CM

## 2025-01-31 PROCEDURE — 62323 NJX INTERLAMINAR LMBR/SAC: CPT

## 2025-03-11 ENCOUNTER — APPOINTMENT (OUTPATIENT)
Dept: PHYSICAL MEDICINE AND REHAB | Facility: CLINIC | Age: 55
End: 2025-03-11

## 2025-04-01 ENCOUNTER — APPOINTMENT (OUTPATIENT)
Dept: PHYSICAL MEDICINE AND REHAB | Facility: CLINIC | Age: 55
End: 2025-04-01
Payer: COMMERCIAL

## 2025-04-01 DIAGNOSIS — M48.07 SPINAL STENOSIS, LUMBOSACRAL REGION: ICD-10-CM

## 2025-04-01 DIAGNOSIS — M54.16 RADICULOPATHY, LUMBAR REGION: ICD-10-CM

## 2025-04-01 DIAGNOSIS — M79.18 MYALGIA, OTHER SITE: ICD-10-CM

## 2025-04-01 PROCEDURE — 99204 OFFICE O/P NEW MOD 45 MIN: CPT

## 2025-04-01 RX ORDER — LIDOCAINE 5% 700 MG/1
5 PATCH TOPICAL
Qty: 30 | Refills: 1 | Status: ACTIVE | COMMUNITY
Start: 2025-04-01 | End: 1900-01-01

## 2025-05-06 ENCOUNTER — APPOINTMENT (OUTPATIENT)
Dept: PHYSICAL MEDICINE AND REHAB | Facility: CLINIC | Age: 55
End: 2025-05-06
Payer: COMMERCIAL

## 2025-05-06 DIAGNOSIS — M54.16 RADICULOPATHY, LUMBAR REGION: ICD-10-CM

## 2025-05-06 DIAGNOSIS — M48.07 SPINAL STENOSIS, LUMBOSACRAL REGION: ICD-10-CM

## 2025-05-06 DIAGNOSIS — M51.26 OTHER INTERVERTEBRAL DISC DISPLACEMENT, LUMBAR REGION: ICD-10-CM

## 2025-05-06 PROCEDURE — 99214 OFFICE O/P EST MOD 30 MIN: CPT | Mod: 93

## 2025-05-23 ENCOUNTER — OUTPATIENT (OUTPATIENT)
Dept: OUTPATIENT SERVICES | Facility: HOSPITAL | Age: 55
LOS: 1 days | End: 2025-05-23
Payer: COMMERCIAL

## 2025-05-23 ENCOUNTER — APPOINTMENT (OUTPATIENT)
Dept: PHYSICAL MEDICINE AND REHAB | Facility: CLINIC | Age: 55
End: 2025-05-23
Payer: COMMERCIAL

## 2025-05-23 DIAGNOSIS — M54.17 RADICULOPATHY, LUMBOSACRAL REGION: ICD-10-CM

## 2025-05-23 DIAGNOSIS — M51.26 OTHER INTERVERTEBRAL DISC DISPLACEMENT, LUMBAR REGION: ICD-10-CM

## 2025-05-23 DIAGNOSIS — M54.16 RADICULOPATHY, LUMBAR REGION: ICD-10-CM

## 2025-05-23 PROCEDURE — 62323 NJX INTERLAMINAR LMBR/SAC: CPT

## 2025-06-24 ENCOUNTER — APPOINTMENT (OUTPATIENT)
Dept: PHYSICAL MEDICINE AND REHAB | Facility: CLINIC | Age: 55
End: 2025-06-24

## 2025-07-08 ENCOUNTER — APPOINTMENT (OUTPATIENT)
Dept: PHYSICAL MEDICINE AND REHAB | Facility: CLINIC | Age: 55
End: 2025-07-08

## 2025-07-29 ENCOUNTER — APPOINTMENT (OUTPATIENT)
Dept: PHYSICAL MEDICINE AND REHAB | Facility: CLINIC | Age: 55
End: 2025-07-29
Payer: COMMERCIAL

## 2025-07-29 DIAGNOSIS — M54.16 RADICULOPATHY, LUMBAR REGION: ICD-10-CM

## 2025-07-29 PROCEDURE — 99214 OFFICE O/P EST MOD 30 MIN: CPT

## 2025-07-29 RX ORDER — CELECOXIB 200 MG/1
200 CAPSULE ORAL
Qty: 60 | Refills: 0 | Status: ACTIVE | COMMUNITY
Start: 2025-07-29 | End: 1900-01-01

## 2025-08-26 ENCOUNTER — APPOINTMENT (OUTPATIENT)
Dept: PHYSICAL MEDICINE AND REHAB | Facility: CLINIC | Age: 55
End: 2025-08-26
Payer: COMMERCIAL

## 2025-08-26 DIAGNOSIS — M48.07 SPINAL STENOSIS, LUMBOSACRAL REGION: ICD-10-CM

## 2025-08-26 DIAGNOSIS — M51.26 OTHER INTERVERTEBRAL DISC DISPLACEMENT, LUMBAR REGION: ICD-10-CM

## 2025-08-26 DIAGNOSIS — M54.16 RADICULOPATHY, LUMBAR REGION: ICD-10-CM

## 2025-08-26 PROCEDURE — 99214 OFFICE O/P EST MOD 30 MIN: CPT | Mod: 95

## 2025-09-08 ENCOUNTER — RX RENEWAL (OUTPATIENT)
Age: 55
End: 2025-09-08

## (undated) DEVICE — VENODYNE/SCD SLEEVE CALF MEDIUM

## (undated) DEVICE — DRAPE 3/4 SHEET W REINFORCEMENT 56X77"

## (undated) DEVICE — SYR ASEPTO

## (undated) DEVICE — DRSG CAST FIBERGLASS 3"

## (undated) DEVICE — DRSG SPLINT ONE-STEP FIBERGLASS 4" X 15"

## (undated) DEVICE — SLING ARM CHIEFTAIN MESH LARGE

## (undated) DEVICE — TOURNIQUET CUFF 18" DUAL PORT SINGLE BLADDER W PLC  (BLACK)

## (undated) DEVICE — PACK HAND

## (undated) DEVICE — WARMING BLANKET LOWER ADULT

## (undated) DEVICE — SUT SURGIPRO 7-0 18" P-10

## (undated) DEVICE — GLV 7.5 PROTEXIS (WHITE)

## (undated) DEVICE — SOL IRR POUR NS 0.9% 1000ML

## (undated) DEVICE — VESSEL LOOP MAXI-YELLOW  0.120" X 16"

## (undated) DEVICE — SUT VICRYL PLUS 3-0 27" PS-2 UNDYED